# Patient Record
Sex: MALE | Race: WHITE | NOT HISPANIC OR LATINO | Employment: UNEMPLOYED | URBAN - METROPOLITAN AREA
[De-identification: names, ages, dates, MRNs, and addresses within clinical notes are randomized per-mention and may not be internally consistent; named-entity substitution may affect disease eponyms.]

---

## 2019-03-20 ENCOUNTER — HOSPITAL ENCOUNTER (EMERGENCY)
Facility: CLINIC | Age: 49
Discharge: HOME | End: 2019-03-21
Attending: EMERGENCY MEDICINE | Admitting: EMERGENCY MEDICINE
Payer: COMMERCIAL

## 2019-03-20 VITALS
HEART RATE: 75 BPM | DIASTOLIC BLOOD PRESSURE: 81 MMHG | BODY MASS INDEX: 31.83 KG/M2 | OXYGEN SATURATION: 95 % | SYSTOLIC BLOOD PRESSURE: 131 MMHG | RESPIRATION RATE: 12 BRPM | HEIGHT: 72 IN | TEMPERATURE: 97.5 F | WEIGHT: 235 LBS

## 2019-03-20 DIAGNOSIS — F10.920 ALCOHOLIC INTOXICATION WITHOUT COMPLICATION (H): ICD-10-CM

## 2019-03-20 PROCEDURE — 99282 EMERGENCY DEPT VISIT SF MDM: CPT

## 2019-03-20 SDOH — HEALTH STABILITY: MENTAL HEALTH: HOW OFTEN DO YOU HAVE A DRINK CONTAINING ALCOHOL?: NEVER

## 2019-03-20 ASSESSMENT — MIFFLIN-ST. JEOR: SCORE: 1963.95

## 2019-03-20 NOTE — ED AVS SNAPSHOT
Emergency Department  [address] [place] [address]  Phone:  [phone]  Fax:  [phone]                                    Merrill Corcoran   MRN: [id number]    Department:   Emergency Department   Date of Visit:  3/20/2019           After Visit Summary Signature Page    I have received my discharge instructions, and my questions have been answered. I have discussed any challenges I see with this plan with the nurse or doctor.    ..........................................................................................................................................  Patient/Patient Representative Signature      ..........................................................................................................................................  Patient Representative Print Name and Relationship to Patient    ..................................................               ................................................  Date                                   Time    ..........................................................................................................................................  Reviewed by Signature/Title    ...................................................              ..............................................  Date                                               Time          22EPIC Rev 08/18

## 2019-03-21 LAB — ALCOHOL BREATH TEST: 0.1 (ref 0–0.01)

## 2019-03-21 NOTE — ED NOTES
Pt was escorted to restroom via guard x1 and RN x1. Pt is noted to be very intoxicated but is OK on his feet. Pt was returned to bed from restroom.

## 2019-03-21 NOTE — ED PROVIDER NOTES
History     Chief Complaint:  Alcohol Intoxication     History limited due to alcohol intoxication. History obtained by EMS.     HPI   Merrill Corcoran is a 48 year old male who presents to the emergency department today via EMS for evaluation of alcohol intoxication. EMS report that [place]. He was helped off by transit police, and initially became aggressive. EMS arrived and restraints were put on, but he has been cooperative with them. Two pints of vodka were found on his person. No other drugs.     On recheck, patient reports that he lives in an apartment by himself on [place] [place] [address]. He denies any pain or other injuries. No complaints.     Allergies:  No Known Drug Allergies    Medications:    Medications reviewed. No current medications.     Past Medical History:    Medical history reviewed. No pertinent medical history.    Past Surgical History:    Surgical history reviewed. No pertinent surgical history.    Family History:    Family history reviewed. No pertinent family history.      Social History:  Smoking Status: Current every day smoker.  Alcohol Use: Positive.  Drug Use: Positive, marijuana      Review of Systems   Unable to perform ROS: Mental status change       Physical Exam     Patient Vitals for the past 24 hrs:   BP Temp Temp src Pulse Resp SpO2 Height Weight   03/20/19 1935 -- 97.5  F (36.4  C) Temporal 75 12 95 % 1.829 m (6') 106.6 kg (235 lb)   03/20/19 1930 131/81 -- -- -- -- -- -- --     Physical Exam  General: Resting on the gurney, appears uncomfortable  Head:  The scalp, face, and head appear normal  Mouth/Throat: Mucus membranes are moist  CV:  Regular rate    Normal S1 and S2  No pathological murmur   Resp:  Breath sounds clear and equal bilaterally    Non-labored, no retractions or accessory muscle use    No coarseness    No wheezing   GI:  Abdomen is soft, no rigidity    No tenderness to palpation  MS:  Normal motor assessment of  all extremities.    Good capillary refill noted.  Skin:   No rash or lesions noted.  Neuro:   Speech is somewhat slurred. Moves all extremities equally. No apparent deficit.  Psych:  Awake. Alert.  .       No thoughts of harming himself or others.     Emergency Department Course     Emergency Department Course:    1920 Nursing notes and vitals reviewed.    1921 I performed an exam of the patient as documented above.     2350 Rechecked.    0100 The care of this patient was signed out to my partner Dr. Morgan.     Impression & Plan      Medical Decision Making:  Merrill Corcoran is a 48 year old male who presents for evaluation of alcohol intoxication. Patient has no known history of Delirium tremens or alcohol withdrawal seizures. There are no signs of co-ingestion including acetaminophen, drugs, medications, volatile alcohols. There are no signs of trauma related to alcohol use and no further workup is needed including head CT.     Patient will be signed out to my partner Dr. Morgan with plan for discharge when buses are running    Diagnosis:      ICD-10-CM    1. Alcoholic intoxication without complication (H) F10.920      Disposition:   Discharge.     Scribe Disclosure:  Ivis PAYNE, am serving as a scribe at 8:18 PM on 3/20/2019 to document services personally performed by Mariza Galo MD based on my observations and the provider's statements to me.       EMERGENCY DEPARTMENT       Mariza Galo MD  03/21/19 0145

## 2019-03-21 NOTE — DISCHARGE INSTRUCTIONS
Discharge Instructions  Alcohol Intoxication    You have been seen today with alcohol intoxication. This means that you have enough alcohol in your system to impair your ability to mentally and physically function, perhaps to the extent that you were unable to care for yourself.    Generally, every Emergency Department visit should have a follow-up clinic visit with either a primary or a specialty clinic/provider. Please follow-up as instructed by your emergency provider today.    You may have come to the Emergency Department because of your intoxication, or for another reason, such as because of an injury. No matter what the case is, this visit is a ?red flag? regarding alcohol use, and you should consider whether your drinking pattern is a problem for you.     You may be at risk for alcohol-related problems if:    Men: you drink more than 14 drinks per week, or more than 4 drinks per occasion.    Women: you drink more than 7 drinks per week or more than 3 drinks per occasion.    You have black-outs.  You do things you regret while drinking.  You have legal problems because of drinking.  You have job problems because of drinking (you call in sick to work because of drinking).    CAGE Questions  Have you ever felt you should cut down on your drinking?  Have people annoyed you by criticizing your drinking?  Have you ever felt bad or guilty about your drinking?  Have you ever had a drink first thing in the morning to steady your nerves or get rid of a hangover (eye opener)?    If you answer yes to any of the CAGE questions, you may have a problem with alcohol.      Return to the Emergency Department if:  You become shaky or tremble when you try to stop drinking.   You have severe abdominal pain (belly pain).   You have a seizure or pass out.    You vomit (throw up) blood or have blood in your stool. This may be bright red or it may look like black coffee grounds.  You become lightheaded or faint.      For further  [facility]   Procedure Discharge Instructions    Today you saw:    Dr. Sandro Hoyt    You had an:  Lumbar transforaminal Epidural steroid injection       Medications used:  Lidocaine   Bupivacaine   Dexamethasone Depo-medrol  IsoVue            Be cautious when walking. Numbness and/or weakness in the lower extremities may occur for up to 6-8 hours after the procedure due to effect of the local anesthetic    Do not drive for 6 hours. The effect of the local anesthetic could slow your reflexes.     You may resume your regular activities after 24 hours    Avoid strenuous activity for the first 24 hours    You may shower, however avoid swimming, tub baths or hot tubs for 24 hours following your procedure    You may have a mild to moderate increase in pain for several days following the injection.    It may take up to 14 days for the steroid medication to start working although you may feel the effect as early as a few days after the procedure.       You may use ice packs for 10-15 minutes, 3 to 4 times a day at the injection site for comfort    Do not use heat to painful areas for 6 to 8 hours. This will give the local anesthetic time to wear off and prevent you from accidentally burning your skin.     You may use anti-inflammatory medications (such as Ibuprofen or Aleve or Advil) or Tylenol for pain control if necessary    Possible side effects of steroids that you may experience include flushing, elevated blood pressure, increased appetite, mild headaches and restlessness.  All of these symptoms will get better with time.    If you experience any of the following, call the Pain Clinic during work hours at [phone] or the Provider Line after hours at [phone]:  -Fever over 100 degree F  -Swelling, bleeding, redness, drainage, warmth at the injection site  -Progressive weakness or numbness in your legs or arms  -Loss of bowel or bladder function  -Unusual headache that is not relieved  by Tylenol or other pain reliever  -Unusual new onset of pain that is not improving       help, contact:   Your caregiver.    Alcoholics Anonymous (AA).    [facility] Intergroup: [phone]  [facility] Central Office: [phone]   A drug or alcohol rehabilitation program.    You can get information on alcohol resources and groups by calling the number 211 or [phone] on any phone.     Seek medical care if:  You have persistent vomiting.   You have persistent pain in any part of your body.    You do not feel better after a few days.    If you were given a prescription for medicine here today, be sure to read all of the information (including the package insert) that comes with your prescription.  This will include important information about the medicine, its side effects, and any warnings that you need to know about.  The pharmacist who fills the prescription can provide more information and answer questions you may have about the medicine.  If you have questions or concerns that the pharmacist cannot address, please call or return to the Emergency Department.   Remember that you can always come back to the Emergency Department if you are not able to see your regular doctor in the amount of time listed above, if you get any new symptoms, or if there is anything that worries you.

## 2019-03-21 NOTE — ED NOTES
"Patient presents to  staff window multiple times inquiring about his length of ED stay and if he is under any charges.  Patient appears shocked when he is oriented to date and time; he stated \"dude it's not February anymore\"  Patient given TV remote for entertainment purposes.  Patient is calm, cooperative and pleasant with staff interactions.  Verbally reviewed patient's plan to stay in ED for the night with possible discharge in AM; patient agrees with plan and inquires about bus schedule.   "

## 2019-03-21 NOTE — ED NOTES
Bed: [facility]  Expected date: 3/20/19  Expected time: 7:05 PM  Means of arrival:   Comments:  [age]M ETOH/agitation/cooperative now

## 2019-03-21 NOTE — ED NOTES
Pt walked from ED room to restroom. Pt was given ice water, toothbrush/toothpaste and a comb per pt's request.

## 2019-08-01 ENCOUNTER — HOSPITAL ENCOUNTER (EMERGENCY)
Facility: CLINIC | Age: 49
Discharge: HOME | End: 2019-08-02
Attending: FAMILY MEDICINE | Admitting: FAMILY MEDICINE
Payer: COMMERCIAL

## 2019-08-01 DIAGNOSIS — F10.920 ALCOHOLIC INTOXICATION WITHOUT COMPLICATION (H): ICD-10-CM

## 2019-08-01 LAB
ALBUMIN SERPL-MCNC: 4 G/DL (ref 3.4–5)
ALP SERPL-CCNC: 86 U/L (ref 40–150)
ALT SERPL W P-5'-P-CCNC: 44 U/L (ref 0–70)
ANION GAP SERPL CALCULATED.3IONS-SCNC: 9 MMOL/L (ref 3–14)
APAP SERPL-MCNC: <2 MG/L (ref 10–20)
AST SERPL W P-5'-P-CCNC: 32 U/L (ref 0–45)
BASOPHILS # BLD AUTO: 0.1 10E9/L (ref 0–0.2)
BASOPHILS NFR BLD AUTO: 0.8 %
BILIRUB SERPL-MCNC: 0.4 MG/DL (ref 0.2–1.3)
BUN SERPL-MCNC: 13 MG/DL (ref 7–30)
CALCIUM SERPL-MCNC: 9 MG/DL (ref 8.5–10.1)
CHLORIDE SERPL-SCNC: 111 MMOL/L (ref 94–109)
CO2 SERPL-SCNC: 24 MMOL/L (ref 20–32)
CREAT SERPL-MCNC: 1.07 MG/DL (ref 0.66–1.25)
DIFFERENTIAL METHOD BLD: ABNORMAL
EOSINOPHIL # BLD AUTO: 0.1 10E9/L (ref 0–0.7)
EOSINOPHIL NFR BLD AUTO: 1.7 %
ERYTHROCYTE [DISTWIDTH] IN BLOOD BY AUTOMATED COUNT: 13.8 % (ref 10–15)
ETHANOL SERPL-MCNC: 0.28 G/DL
GFR SERPL CREATININE-BSD FRML MDRD: 80 ML/MIN/{1.73_M2}
GLUCOSE SERPL-MCNC: 90 MG/DL (ref 70–99)
HCT VFR BLD AUTO: 46.3 % (ref 40–53)
HGB BLD-MCNC: 14.9 G/DL (ref 13.3–17.7)
IMM GRANULOCYTES # BLD: 0 10E9/L (ref 0–0.4)
IMM GRANULOCYTES NFR BLD: 0.3 %
LIPASE SERPL-CCNC: 96 U/L (ref 73–393)
LYMPHOCYTES # BLD AUTO: 2.5 10E9/L (ref 0.8–5.3)
LYMPHOCYTES NFR BLD AUTO: 34.3 %
MCH RBC QN AUTO: 33.1 PG (ref 26.5–33)
MCHC RBC AUTO-ENTMCNC: 32.2 G/DL (ref 31.5–36.5)
MCV RBC AUTO: 103 FL (ref 78–100)
MONOCYTES # BLD AUTO: 0.5 10E9/L (ref 0–1.3)
MONOCYTES NFR BLD AUTO: 7.4 %
NEUTROPHILS # BLD AUTO: 4 10E9/L (ref 1.6–8.3)
NEUTROPHILS NFR BLD AUTO: 55.5 %
NRBC # BLD AUTO: 0 10*3/UL
NRBC BLD AUTO-RTO: 0 /100
PLATELET # BLD AUTO: 252 10E9/L (ref 150–450)
POTASSIUM SERPL-SCNC: 3.7 MMOL/L (ref 3.4–5.3)
PROT SERPL-MCNC: 7.9 G/DL (ref 6.8–8.8)
RBC # BLD AUTO: 4.5 10E12/L (ref 4.4–5.9)
SALICYLATES SERPL-MCNC: <2 MG/DL
SODIUM SERPL-SCNC: 144 MMOL/L (ref 133–144)
TROPONIN I SERPL-MCNC: <0.015 UG/L (ref 0–0.04)
WBC # BLD AUTO: 7.2 10E9/L (ref 4–11)

## 2019-08-01 PROCEDURE — 80329 ANALGESICS NON-OPIOID 1 OR 2: CPT | Performed by: FAMILY MEDICINE

## 2019-08-01 PROCEDURE — 99285 EMERGENCY DEPT VISIT HI MDM: CPT | Mod: 25 | Performed by: FAMILY MEDICINE

## 2019-08-01 PROCEDURE — 25000128 H RX IP 250 OP 636

## 2019-08-01 PROCEDURE — 80053 COMPREHEN METABOLIC PANEL: CPT | Performed by: FAMILY MEDICINE

## 2019-08-01 PROCEDURE — 84484 ASSAY OF TROPONIN QUANT: CPT | Performed by: FAMILY MEDICINE

## 2019-08-01 PROCEDURE — 93005 ELECTROCARDIOGRAM TRACING: CPT | Performed by: FAMILY MEDICINE

## 2019-08-01 PROCEDURE — 99284 EMERGENCY DEPT VISIT MOD MDM: CPT | Mod: Z6 | Performed by: FAMILY MEDICINE

## 2019-08-01 PROCEDURE — 25000128 H RX IP 250 OP 636: Performed by: FAMILY MEDICINE

## 2019-08-01 PROCEDURE — 96375 TX/PRO/DX INJ NEW DRUG ADDON: CPT | Performed by: FAMILY MEDICINE

## 2019-08-01 PROCEDURE — 96361 HYDRATE IV INFUSION ADD-ON: CPT | Performed by: FAMILY MEDICINE

## 2019-08-01 PROCEDURE — 25800030 ZZH RX IP 258 OP 636: Performed by: FAMILY MEDICINE

## 2019-08-01 PROCEDURE — 83690 ASSAY OF LIPASE: CPT | Performed by: FAMILY MEDICINE

## 2019-08-01 PROCEDURE — 96374 THER/PROPH/DIAG INJ IV PUSH: CPT | Performed by: FAMILY MEDICINE

## 2019-08-01 PROCEDURE — 80178 ASSAY OF LITHIUM: CPT | Performed by: FAMILY MEDICINE

## 2019-08-01 PROCEDURE — 85025 COMPLETE CBC W/AUTO DIFF WBC: CPT | Performed by: FAMILY MEDICINE

## 2019-08-01 PROCEDURE — 80320 DRUG SCREEN QUANTALCOHOLS: CPT | Performed by: FAMILY MEDICINE

## 2019-08-01 PROCEDURE — 80307 DRUG TEST PRSMV CHEM ANLYZR: CPT | Performed by: FAMILY MEDICINE

## 2019-08-01 RX ORDER — OLANZAPINE 10 MG/2ML
10 INJECTION, POWDER, FOR SOLUTION INTRAMUSCULAR ONCE
Status: COMPLETED | OUTPATIENT
Start: 2019-08-01 | End: 2019-08-01

## 2019-08-01 RX ORDER — GABAPENTIN 300 MG/1
300 CAPSULE ORAL 3 TIMES DAILY
Status: ON HOLD | COMMUNITY
Start: 2019-07-17 | End: 2019-08-06

## 2019-08-01 RX ORDER — RISPERIDONE 4 MG/1
TABLET ORAL
COMMUNITY
Start: 2010-08-06 | End: 2019-08-05

## 2019-08-01 RX ORDER — HALOPERIDOL 5 MG/ML
5 INJECTION INTRAMUSCULAR ONCE
Status: COMPLETED | OUTPATIENT
Start: 2019-08-01 | End: 2019-08-01

## 2019-08-01 RX ORDER — LITHIUM CARBONATE 300 MG/1
TABLET, FILM COATED, EXTENDED RELEASE ORAL
COMMUNITY
Start: 2010-08-06 | End: 2019-08-05

## 2019-08-01 RX ORDER — OLANZAPINE 10 MG/2ML
INJECTION, POWDER, FOR SOLUTION INTRAMUSCULAR
Status: COMPLETED
Start: 2019-08-01 | End: 2019-08-01

## 2019-08-01 RX ORDER — KETAMINE HYDROCHLORIDE 100 MG/ML
420 INJECTION, SOLUTION INTRAMUSCULAR; INTRAVENOUS ONCE
Status: DISCONTINUED | OUTPATIENT
Start: 2019-08-01 | End: 2019-08-02 | Stop reason: HOSPADM

## 2019-08-01 RX ORDER — SODIUM CHLORIDE, SODIUM LACTATE, POTASSIUM CHLORIDE, CALCIUM CHLORIDE 600; 310; 30; 20 MG/100ML; MG/100ML; MG/100ML; MG/100ML
1000 INJECTION, SOLUTION INTRAVENOUS CONTINUOUS
Status: DISCONTINUED | OUTPATIENT
Start: 2019-08-01 | End: 2019-08-02 | Stop reason: HOSPADM

## 2019-08-01 RX ADMIN — OLANZAPINE 10 MG: 10 INJECTION, POWDER, FOR SOLUTION INTRAMUSCULAR at 21:49

## 2019-08-01 RX ADMIN — SODIUM CHLORIDE, POTASSIUM CHLORIDE, SODIUM LACTATE AND CALCIUM CHLORIDE 500 ML: 600; 310; 30; 20 INJECTION, SOLUTION INTRAVENOUS at 21:48

## 2019-08-01 RX ADMIN — HALOPERIDOL LACTATE 5 MG: 5 INJECTION, SOLUTION INTRAMUSCULAR at 22:05

## 2019-08-01 NOTE — ED AVS SNAPSHOT
[facility] Emergency Department  [address] [address]  Phone:  [phone]  Fax:  [phone]                                    Merrill Corcoran   MRN: [id number]    Department:  [facility] Emergency Department   Date of Visit:  8/1/2019           After Visit Summary Signature Page    I have received my discharge instructions, and my questions have been answered. I have discussed any challenges I see with this plan with the nurse or doctor.    ..........................................................................................................................................  Patient/Patient Representative Signature      ..........................................................................................................................................  Patient Representative Print Name and Relationship to Patient    ..................................................               ................................................  Date                                   Time    ..........................................................................................................................................  Reviewed by Signature/Title    ...................................................              ..............................................  Date                                               Time          22EPIC Rev 08/18

## 2019-08-02 VITALS
RESPIRATION RATE: 13 BRPM | SYSTOLIC BLOOD PRESSURE: 114 MMHG | TEMPERATURE: 98 F | OXYGEN SATURATION: 96 % | HEART RATE: 82 BPM | DIASTOLIC BLOOD PRESSURE: 70 MMHG

## 2019-08-02 LAB
AMPHETAMINES UR QL SCN: NEGATIVE
BARBITURATES UR QL: NEGATIVE
BENZODIAZ UR QL: POSITIVE
CANNABINOIDS UR QL SCN: NEGATIVE
COCAINE UR QL: NEGATIVE
LITHIUM SERPL-SCNC: <0.2 MMOL/L (ref 0.6–1.2)
OPIATES UR QL SCN: NEGATIVE
PCP UR QL SCN: NEGATIVE

## 2019-08-02 NOTE — ED NOTES
Restraints removed after pt agreed to remain calm and cooperative. Pt assisted to sitting on side of bed to use urinal. Pt unable to urinate, declined offer of water to drink.

## 2019-08-02 NOTE — ED NOTES
Pt arrived in restraints via EMS to ER, pt pulling on restraints. Order obtained from Dr Rascon for zyprexa that was given to pt before he was removed from EMS restraints to ER restraints. 4 locked wrist restraints and chest posey put on, less struggling by pt as zyprexa took effect. Pt has healing bruising on abdomen and scattered rash and scabs on lower legs.

## 2019-08-02 NOTE — DISCHARGE INSTRUCTIONS
1) you are discharged home on your own accord after period of observation and after you were allowed to metabolize your alcohol.    2) Follow-up care with your primary provider is suggested.    3) if you desire help or support for alcohol dependence

## 2019-08-02 NOTE — ED PROVIDER NOTES
HPI   The patient is a 48-year-old male presenting by EMS transport for alcohol intoxication and combative behavior.  The patient was just discharged from the [facility] this morning.  He was admitted for alcohol withdrawal symptoms.  There is a discharge summary note on his person.  He apparently takes gabapentin for a seizure disorder though is not had any recent seizures, per this report.  He drinks alcohol regularly and has a known history of alcoholism.  No reported history of drugs of abuse.  He denies any drugs of abuse.    The patient was found at a local hotel to be combative and obviously intoxicated.  Police were called and then EMS.  The patient has not been violent or hitting but is not cooperative feet and hand straps for transport.  He was given 2 mg of Versed in route.  Here, the patient reports some chest pain.  He cannot give any details.  He denies other symptoms when questioned, specifically.  He denies any recent falls or injuries.  When asked about the bruising on his abdomen he mumbled and I couldn't understand him.  Overall is a very poor historian.        Allergies:  No Known Allergies  Problem List:    Patient Active Problem List    Diagnosis Date Noted     Malignant neoplasm of urinary bladder (H) 03/09/2012     Priority: Medium     Lung nodule 01/31/2012     Priority: Medium     Psoriasis 02/06/2008     Priority: Medium     Drug dependence (H) 06/12/2007     Priority: Medium     Overview:   Epic        Alcohol dependence with withdrawal (H) 12/21/2004     Priority: Medium     Overview:   ALCOHOL DEPENDENCE UNSPECIFIED(aka ALCOHOLISM)       Antisocial personality disorder (H) 12/21/2004     Priority: Medium     Calculus of kidney 12/21/2004     Priority: Medium     Overview:   CALCULUS OF KIDNEY(aka NEPHROLITHIASIS)       Delay in development 12/21/2004     Priority: Medium     Essential hypertension, benign 12/21/2004     Priority: Medium     Overview:   BENIGN HYPERTENSION(aka  HYPERTENSION)       Hepatitis C carrier (H) 12/21/2004     Priority: Medium     Schizoaffective disorder, chronic condition (H) 10/25/2004     Priority: Medium      Past Medical History:    No past medical history on file.  Past Surgical History:    No past surgical history on file.  Family History:    No family history on file.  Social History:  Marital Status:  Single [1]  Social History     Tobacco Use     Smoking status: Current Every Day Smoker   Substance Use Topics     Alcohol use: Yes     Frequency: Never     Drug use: Yes     Types: Marijuana      Medications:      gabapentin (NEURONTIN) 300 MG capsule   lithium ER (LITHOBID) 300 MG CR tablet   risperiDONE (RISPERDAL) 4 MG tablet     Review of Systems   All other systems reviewed and are negative.      PE   BP: 100/48  Pulse: 86  Heart Rate: 83  Temp: 98  F (36.7  C)  Resp: (!) 0  SpO2: (!) 86 %  Physical Exam   Constitutional: He appears distressed.   Obviously intoxicated.  He will attempt to answer questions but does so understandably but only briefly and then he mumbles.  He smells of alcohol.   HENT:   Head: Atraumatic.   Right Ear: External ear normal.   Left Ear: External ear normal.   Nose: Nose normal.   Mouth/Throat: Oropharynx is clear and moist.   Eyes: Pupils are equal, round, and reactive to light. EOM are normal. Right eye exhibits no discharge. Left eye exhibits no discharge. No scleral icterus.   Neck: Normal range of motion.   Cardiovascular: Normal rate, normal heart sounds and intact distal pulses.   Pulmonary/Chest: Breath sounds normal. No respiratory distress.   Abdominal: Soft. Bowel sounds are normal.   The patient complains of tenderness throughout.  He does have some overlying bruising that appears to be old.   Genitourinary: Penis normal.   Musculoskeletal: Normal range of motion. He exhibits no edema, tenderness or deformity.   Neurological:   Arouses easily to voice.   Skin: Skin is warm. No rash noted. He is not diaphoretic.    Psychiatric:   Intoxicated.   Nursing note and vitals reviewed.      ED COURSE and MDM   2133.  The patient is obviously intoxicated.  He was combative when EMS picked him up, as above.  He was given Versed in route.  I provided Zyprexa 10 mg IM here.  The patient required security transport into his room bed.  Lab values ordered.  Fluid bolus.  EKG.    2232.  The patient continued to yell out intermittently and so Haldol 5 mg IM was ordered.    2337.  Signed out to Dr. Sams.    LABS  Labs Ordered and Resulted from Time of ED Arrival Up to the Time of Departure from the ED   CBC WITH PLATELETS DIFFERENTIAL - Abnormal; Notable for the following components:       Result Value     (*)     MCH 33.1 (*)     All other components within normal limits   COMPREHENSIVE METABOLIC PANEL - Abnormal; Notable for the following components:    Chloride 111 (*)     All other components within normal limits   ALCOHOL ETHYL - Abnormal; Notable for the following components:    Ethanol g/dL 0.28 (*)     All other components within normal limits   LIPASE   ACETAMINOPHEN LEVEL   SALICYLATE LEVEL   TROPONIN I   LITHIUM LEVEL   DRUG ABUSE SCREEN 77 URINE (FL, RH, SH)   LITHIUM LEVEL   MAY SALINE LOCK IV       IMAGING  Images reviewed by me.  Radiology report also reviewed.  No orders to display       Medications   ketamine (KETALAR) (HIGH CONC) inj 420 mg (has no administration in time range)   lactated ringers infusion (has no administration in time range)   OLANZapine (zyPREXA) injection 10 mg (10 mg Intramuscular Given 8/1/19 2149)   lactated ringers BOLUS 500 mL (500 mLs Intravenous New Bag 8/1/19 2148)   haloperidol lactate (HALDOL) injection 5 mg (5 mg IV/IM Given 8/1/19 2205)         IMPRESSION   ETOH intoxication         Medication List      There are no discharge medications for this visit.                       Carlitos Rascon MD  08/01/19 8931

## 2019-08-02 NOTE — ED PROVIDER NOTES
Emergency Department Patient Sign-out     Brief HPI:  This is a 48 year old male signed out to me by Dr. Bain .  Please see initial provider note for details of the presentation.      Significant Events prior to my assuming care: Patient was recently discharged from [place] for management of alcohol intoxication but he admits that he declined help with his alcohol use and elected to return home.  He was brought in tonight by EMS for report of alcohol intoxication and belligerent behavior, police had been involved previously and requested EMS transport.  For the safety of himself and others, he required chemical restraint.  Plan at time of transfer of care from initial provider was to monitor while patient metabolizes and reassess before arranging disposition.       Exam:   Patient Vitals for the past 24 hrs:   BP Temp Temp src Pulse Heart Rate Resp SpO2   08/02/19 0500 -- -- -- -- 61 -- 96 %   08/02/19 0430 -- -- -- -- 58 13 97 %   08/02/19 0400 -- -- -- -- 60 15 97 %   08/02/19 0330 -- -- -- -- 61 -- 95 %   08/02/19 0300 -- -- -- -- 64 14 98 %   08/02/19 0244 -- -- -- -- 61 15 99 %   08/02/19 0235 -- -- -- -- 61 14 99 %   08/02/19 0212 112/70 -- -- 63 60 16 98 %   08/02/19 0210 (!) 88/50 -- -- 64 67 13 98 %   08/02/19 0130 -- -- -- -- 63 12 96 %   08/02/19 0102 -- -- -- -- 69 -- 95 %   08/02/19 0045 -- -- -- -- 67 15 98 %   08/02/19 0036 109/66 -- -- 71 109 -- 95 %   08/02/19 0018 91/41 -- -- 65 64 16 94 %   08/02/19 0000 96/52 -- -- 69 64 -- 97 %   08/01/19 2345 90/51 -- -- 64 81 -- 95 %   08/01/19 2331 122/70 -- -- 81 84 -- 95 %   08/01/19 2330 122/70 -- -- 81 81 -- 95 %   08/01/19 2315 112/68 -- -- 83 78 15 94 %   08/01/19 2300 111/63 -- -- 80 -- -- 97 %   08/01/19 2245 116/66 -- -- 76 -- -- 95 %   08/01/19 2230 103/61 -- -- 80 -- -- 90 %   08/01/19 2215 124/69 -- -- 83 -- -- (!) 87 %   08/01/19 2200 100/69 -- -- 82 84 12 92 %   08/01/19 2155 -- 98  F (36.7  C) Oral -- -- -- --   08/01/19 2145 115/70 -- --  86 86 17 90 %   08/01/19 2130 104/66 -- -- 82 81 14 91 %   08/01/19 2123 100/48 -- -- 86 83 (!) 0 (!) 86 %         ED RESULTS:   Results for orders placed or performed during the hospital encounter of 08/01/19 (from the past 24 hour(s))   CBC with platelets differential     Status: Abnormal    Collection Time: 08/01/19  9:37 PM   Result Value Ref Range    WBC 7.2 4.0 - 11.0 10e9/L    RBC Count 4.50 4.4 - 5.9 10e12/L    Hemoglobin 14.9 13.3 - 17.7 g/dL    Hematocrit 46.3 40.0 - 53.0 %     (H) 78 - 100 fl    MCH 33.1 (H) 26.5 - 33.0 pg    MCHC 32.2 31.5 - 36.5 g/dL    RDW 13.8 10.0 - 15.0 %    Platelet Count 252 150 - 450 10e9/L    Diff Method Automated Method     % Neutrophils 55.5 %    % Lymphocytes 34.3 %    % Monocytes 7.4 %    % Eosinophils 1.7 %    % Basophils 0.8 %    % Immature Granulocytes 0.3 %    Nucleated RBCs 0 0 /100    Absolute Neutrophil 4.0 1.6 - 8.3 10e9/L    Absolute Lymphocytes 2.5 0.8 - 5.3 10e9/L    Absolute Monocytes 0.5 0.0 - 1.3 10e9/L    Absolute Eosinophils 0.1 0.0 - 0.7 10e9/L    Absolute Basophils 0.1 0.0 - 0.2 10e9/L    Abs Immature Granulocytes 0.0 0 - 0.4 10e9/L    Absolute Nucleated RBC 0.0    Comprehensive metabolic panel     Status: Abnormal    Collection Time: 08/01/19  9:37 PM   Result Value Ref Range    Sodium 144 133 - 144 mmol/L    Potassium 3.7 3.4 - 5.3 mmol/L    Chloride 111 (H) 94 - 109 mmol/L    Carbon Dioxide 24 20 - 32 mmol/L    Anion Gap 9 3 - 14 mmol/L    Glucose 90 70 - 99 mg/dL    Urea Nitrogen 13 7 - 30 mg/dL    Creatinine 1.07 0.66 - 1.25 mg/dL    GFR Estimate 80 >60 mL/min/[1.73_m2]    GFR Estimate If Black >90 >60 mL/min/[1.73_m2]    Calcium 9.0 8.5 - 10.1 mg/dL    Bilirubin Total 0.4 0.2 - 1.3 mg/dL    Albumin 4.0 3.4 - 5.0 g/dL    Protein Total 7.9 6.8 - 8.8 g/dL    Alkaline Phosphatase 86 40 - 150 U/L    ALT 44 0 - 70 U/L    AST 32 0 - 45 U/L   Lipase     Status: None    Collection Time: 08/01/19  9:37 PM   Result Value Ref Range    Lipase 96 73 - 393  U/L   Alcohol ethyl     Status: Abnormal    Collection Time: 08/01/19  9:37 PM   Result Value Ref Range    Ethanol g/dL 0.28 (H) <0.01 g/dL   Acetaminophen level     Status: None    Collection Time: 08/01/19  9:37 PM   Result Value Ref Range    Acetaminophen Level <2 mg/L   Salicylate level     Status: None    Collection Time: 08/01/19  9:37 PM   Result Value Ref Range    Salicylate Level <2 mg/dL   Troponin I     Status: None    Collection Time: 08/01/19  9:37 PM   Result Value Ref Range    Troponin I ES <0.015 0.000 - 0.045 ug/L   Lithium level     Status: Abnormal    Collection Time: 08/01/19  9:37 PM   Result Value Ref Range    Lithium Level <0.20 (L) 0.60 - 1.20 mmol/L         ED MEDICATIONS:   Medications   ketamine (KETALAR) (HIGH CONC) inj 420 mg (0 mg Intramuscular Hold 8/1/19 2330)   lactated ringers infusion (0 mLs Intravenous Hold 8/2/19 0043)   OLANZapine (zyPREXA) injection 10 mg (10 mg Intramuscular Given 8/1/19 2149)   lactated ringers BOLUS 500 mL (0 mLs Intravenous Stopped 8/1/19 2330)   haloperidol lactate (HALDOL) injection 5 mg (5 mg IV/IM Given 8/1/19 2205)         Plan:    Patient remained comfortable throughout the night, monitored closely without event.  Signed out to oncoming morning physician Dr. Hunter for reevaluation and safe disposition plan.        Impression:    ICD-10-CM    1. Alcoholic intoxication without complication (H) F10.920            Merirll Navas,   08/02/19 0612

## 2019-08-02 NOTE — ED NOTES
Pt continued to pull on restraints, pt repositioned in bed, chest posey loosened and repositioned as it was pulling into pt armpits and he was reporting discomfort. This RN continues to have difficulty understanding pt due to mumbled speech. Pt continues to be on 1:1 monitoring.

## 2019-08-05 ENCOUNTER — RECORDS - HEALTHEAST (OUTPATIENT)
Dept: ADMINISTRATIVE | Facility: OTHER | Age: 49
End: 2019-08-05

## 2019-08-05 ENCOUNTER — HOSPITAL ENCOUNTER (INPATIENT)
Facility: CLINIC | Age: 49
LOS: 21 days | Discharge: PSYCHIATRIC OR SUBSTANCE USE FACILITY | DRG: 885 | End: 2019-08-27
Attending: EMERGENCY MEDICINE | Admitting: PSYCHIATRY & NEUROLOGY
Payer: COMMERCIAL

## 2019-08-05 DIAGNOSIS — Z71.6 ENCOUNTER FOR SMOKING CESSATION COUNSELING: ICD-10-CM

## 2019-08-05 DIAGNOSIS — R45.851 DEPRESSION WITH SUICIDAL IDEATION: ICD-10-CM

## 2019-08-05 DIAGNOSIS — F41.1 GAD (GENERALIZED ANXIETY DISORDER): ICD-10-CM

## 2019-08-05 DIAGNOSIS — F10.239 ALCOHOL DEPENDENCE WITH WITHDRAWAL WITH COMPLICATION (H): ICD-10-CM

## 2019-08-05 DIAGNOSIS — R06.2 WHEEZING: ICD-10-CM

## 2019-08-05 DIAGNOSIS — F32.A DEPRESSION WITH SUICIDAL IDEATION: ICD-10-CM

## 2019-08-05 DIAGNOSIS — G47.00 INSOMNIA, UNSPECIFIED TYPE: ICD-10-CM

## 2019-08-05 DIAGNOSIS — C84.09 MYCOSIS FUNGOIDES, EXTRANODAL AND SOLID ORGAN SITES (H): ICD-10-CM

## 2019-08-05 DIAGNOSIS — I10 ESSENTIAL HYPERTENSION, BENIGN: ICD-10-CM

## 2019-08-05 DIAGNOSIS — B18.2 HEPATITIS C CARRIER (H): Primary | ICD-10-CM

## 2019-08-05 DIAGNOSIS — F25.9 SCHIZOAFFECTIVE DISORDER, UNSPECIFIED TYPE (H): ICD-10-CM

## 2019-08-05 DIAGNOSIS — F10.220 ALCOHOL DEPENDENCE WITH UNCOMPLICATED INTOXICATION (H): ICD-10-CM

## 2019-08-05 DIAGNOSIS — R45.851 SUICIDE IDEATION: ICD-10-CM

## 2019-08-05 DIAGNOSIS — F17.200 TOBACCO USE DISORDER: ICD-10-CM

## 2019-08-05 DIAGNOSIS — E56.9 VITAMIN DEFICIENCY: ICD-10-CM

## 2019-08-05 LAB
ALBUMIN SERPL-MCNC: 3.6 G/DL (ref 3.4–5)
ALCOHOL BREATH TEST: 0.22 (ref 0–0.01)
ALP SERPL-CCNC: 93 U/L (ref 40–150)
ALT SERPL W P-5'-P-CCNC: 47 U/L (ref 0–70)
AMPHETAMINES UR QL SCN: NEGATIVE
ANION GAP SERPL CALCULATED.3IONS-SCNC: 8 MMOL/L (ref 3–14)
AST SERPL W P-5'-P-CCNC: 41 U/L (ref 0–45)
BARBITURATES UR QL: NEGATIVE
BASOPHILS # BLD AUTO: 0.1 10E9/L (ref 0–0.2)
BASOPHILS NFR BLD AUTO: 1.1 %
BENZODIAZ UR QL: POSITIVE
BILIRUB SERPL-MCNC: 0.4 MG/DL (ref 0.2–1.3)
BUN SERPL-MCNC: 9 MG/DL (ref 7–30)
CALCIUM SERPL-MCNC: 8.5 MG/DL (ref 8.5–10.1)
CANNABINOIDS UR QL SCN: NEGATIVE
CHLORIDE SERPL-SCNC: 107 MMOL/L (ref 94–109)
CO2 SERPL-SCNC: 29 MMOL/L (ref 20–32)
COCAINE UR QL: NEGATIVE
CREAT SERPL-MCNC: 0.76 MG/DL (ref 0.66–1.25)
DIFFERENTIAL METHOD BLD: ABNORMAL
EOSINOPHIL # BLD AUTO: 0.3 10E9/L (ref 0–0.7)
EOSINOPHIL NFR BLD AUTO: 4.5 %
ERYTHROCYTE [DISTWIDTH] IN BLOOD BY AUTOMATED COUNT: 14.3 % (ref 10–15)
ETHANOL UR QL SCN: POSITIVE
GFR SERPL CREATININE-BSD FRML MDRD: >90 ML/MIN/{1.73_M2}
GLUCOSE SERPL-MCNC: 87 MG/DL (ref 70–99)
HCT VFR BLD AUTO: 42.5 % (ref 40–53)
HGB BLD-MCNC: 14.3 G/DL (ref 13.3–17.7)
IMM GRANULOCYTES # BLD: 0 10E9/L (ref 0–0.4)
IMM GRANULOCYTES NFR BLD: 0.3 %
LYMPHOCYTES # BLD AUTO: 3.2 10E9/L (ref 0.8–5.3)
LYMPHOCYTES NFR BLD AUTO: 50.5 %
MAGNESIUM SERPL-MCNC: 2.1 MG/DL (ref 1.6–2.3)
MCH RBC QN AUTO: 33.7 PG (ref 26.5–33)
MCHC RBC AUTO-ENTMCNC: 33.6 G/DL (ref 31.5–36.5)
MCV RBC AUTO: 100 FL (ref 78–100)
MONOCYTES # BLD AUTO: 0.6 10E9/L (ref 0–1.3)
MONOCYTES NFR BLD AUTO: 9.5 %
NEUTROPHILS # BLD AUTO: 2.2 10E9/L (ref 1.6–8.3)
NEUTROPHILS NFR BLD AUTO: 34.1 %
NRBC # BLD AUTO: 0 10*3/UL
NRBC BLD AUTO-RTO: 0 /100
OPIATES UR QL SCN: NEGATIVE
PLATELET # BLD AUTO: 280 10E9/L (ref 150–450)
POTASSIUM SERPL-SCNC: 3.8 MMOL/L (ref 3.4–5.3)
PROT SERPL-MCNC: 7.6 G/DL (ref 6.8–8.8)
RBC # BLD AUTO: 4.24 10E12/L (ref 4.4–5.9)
SODIUM SERPL-SCNC: 144 MMOL/L (ref 133–144)
WBC # BLD AUTO: 6.4 10E9/L (ref 4–11)

## 2019-08-05 PROCEDURE — 96360 HYDRATION IV INFUSION INIT: CPT | Performed by: EMERGENCY MEDICINE

## 2019-08-05 PROCEDURE — 80320 DRUG SCREEN QUANTALCOHOLS: CPT | Performed by: FAMILY MEDICINE

## 2019-08-05 PROCEDURE — 25000128 H RX IP 250 OP 636: Performed by: EMERGENCY MEDICINE

## 2019-08-05 PROCEDURE — 25000125 ZZHC RX 250: Performed by: EMERGENCY MEDICINE

## 2019-08-05 PROCEDURE — 82075 ASSAY OF BREATH ETHANOL: CPT | Performed by: EMERGENCY MEDICINE

## 2019-08-05 PROCEDURE — 99285 EMERGENCY DEPT VISIT HI MDM: CPT | Mod: 25 | Performed by: EMERGENCY MEDICINE

## 2019-08-05 PROCEDURE — 83735 ASSAY OF MAGNESIUM: CPT | Performed by: EMERGENCY MEDICINE

## 2019-08-05 PROCEDURE — 85025 COMPLETE CBC W/AUTO DIFF WBC: CPT | Performed by: EMERGENCY MEDICINE

## 2019-08-05 PROCEDURE — 80307 DRUG TEST PRSMV CHEM ANLYZR: CPT | Performed by: FAMILY MEDICINE

## 2019-08-05 PROCEDURE — 80053 COMPREHEN METABOLIC PANEL: CPT | Performed by: EMERGENCY MEDICINE

## 2019-08-05 PROCEDURE — 99285 EMERGENCY DEPT VISIT HI MDM: CPT | Mod: Z6 | Performed by: EMERGENCY MEDICINE

## 2019-08-05 PROCEDURE — 96361 HYDRATE IV INFUSION ADD-ON: CPT | Performed by: EMERGENCY MEDICINE

## 2019-08-05 PROCEDURE — 94640 AIRWAY INHALATION TREATMENT: CPT | Performed by: EMERGENCY MEDICINE

## 2019-08-05 RX ORDER — MULTIPLE VITAMINS W/ MINERALS TAB 9MG-400MCG
1 TAB ORAL DAILY
Status: DISCONTINUED | OUTPATIENT
Start: 2019-08-06 | End: 2019-08-27 | Stop reason: HOSPADM

## 2019-08-05 RX ORDER — IPRATROPIUM BROMIDE AND ALBUTEROL SULFATE 2.5; .5 MG/3ML; MG/3ML
3 SOLUTION RESPIRATORY (INHALATION) ONCE
Status: COMPLETED | OUTPATIENT
Start: 2019-08-05 | End: 2019-08-05

## 2019-08-05 RX ORDER — DIAZEPAM 5 MG
5-20 TABLET ORAL EVERY 30 MIN PRN
Status: DISCONTINUED | OUTPATIENT
Start: 2019-08-05 | End: 2019-08-06

## 2019-08-05 RX ORDER — LANOLIN ALCOHOL/MO/W.PET/CERES
100 CREAM (GRAM) TOPICAL DAILY
Status: DISCONTINUED | OUTPATIENT
Start: 2019-08-06 | End: 2019-08-27 | Stop reason: HOSPADM

## 2019-08-05 RX ORDER — FOLIC ACID 1 MG/1
1 TABLET ORAL DAILY
Status: DISCONTINUED | OUTPATIENT
Start: 2019-08-06 | End: 2019-08-27 | Stop reason: HOSPADM

## 2019-08-05 RX ADMIN — SODIUM CHLORIDE 1000 ML: 9 INJECTION, SOLUTION INTRAVENOUS at 21:42

## 2019-08-05 RX ADMIN — IPRATROPIUM BROMIDE AND ALBUTEROL SULFATE 3 ML: .5; 3 SOLUTION RESPIRATORY (INHALATION) at 21:38

## 2019-08-05 ASSESSMENT — ENCOUNTER SYMPTOMS
VOMITING: 0
DYSPHORIC MOOD: 1
HEADACHES: 0
FEVER: 0
COLOR CHANGE: 0
SHORTNESS OF BREATH: 0
NECK STIFFNESS: 0
ABDOMINAL PAIN: 0
CONFUSION: 0
ARTHRALGIAS: 0
DIFFICULTY URINATING: 0
EYE REDNESS: 0
NAUSEA: 0

## 2019-08-06 PROBLEM — F32.A DEPRESSION WITH SUICIDAL IDEATION: Status: ACTIVE | Noted: 2019-08-06

## 2019-08-06 PROBLEM — R45.851 DEPRESSION WITH SUICIDAL IDEATION: Status: ACTIVE | Noted: 2019-08-06

## 2019-08-06 PROCEDURE — 12400001 ZZH R&B MH UMMC

## 2019-08-06 PROCEDURE — 25000132 ZZH RX MED GY IP 250 OP 250 PS 637: Performed by: PSYCHIATRY & NEUROLOGY

## 2019-08-06 PROCEDURE — 25000132 ZZH RX MED GY IP 250 OP 250 PS 637: Performed by: EMERGENCY MEDICINE

## 2019-08-06 PROCEDURE — 99223 1ST HOSP IP/OBS HIGH 75: CPT | Mod: AI | Performed by: PSYCHIATRY & NEUROLOGY

## 2019-08-06 RX ORDER — OLANZAPINE 10 MG/2ML
10 INJECTION, POWDER, FOR SOLUTION INTRAMUSCULAR
Status: DISCONTINUED | OUTPATIENT
Start: 2019-08-06 | End: 2019-08-27 | Stop reason: HOSPADM

## 2019-08-06 RX ORDER — BISACODYL 10 MG
10 SUPPOSITORY, RECTAL RECTAL DAILY PRN
Status: DISCONTINUED | OUTPATIENT
Start: 2019-08-06 | End: 2019-08-27 | Stop reason: HOSPADM

## 2019-08-06 RX ORDER — TRAZODONE HYDROCHLORIDE 50 MG/1
50 TABLET, FILM COATED ORAL
Status: DISCONTINUED | OUTPATIENT
Start: 2019-08-06 | End: 2019-08-27 | Stop reason: HOSPADM

## 2019-08-06 RX ORDER — ALUMINA, MAGNESIA, AND SIMETHICONE 2400; 2400; 240 MG/30ML; MG/30ML; MG/30ML
30 SUSPENSION ORAL EVERY 4 HOURS PRN
Status: DISCONTINUED | OUTPATIENT
Start: 2019-08-06 | End: 2019-08-27 | Stop reason: HOSPADM

## 2019-08-06 RX ORDER — GABAPENTIN 300 MG/1
300 CAPSULE ORAL 3 TIMES DAILY
Status: DISCONTINUED | OUTPATIENT
Start: 2019-08-06 | End: 2019-08-27 | Stop reason: HOSPADM

## 2019-08-06 RX ORDER — DIAZEPAM 5 MG
5-20 TABLET ORAL EVERY 30 MIN PRN
Status: DISCONTINUED | OUTPATIENT
Start: 2019-08-06 | End: 2019-08-12

## 2019-08-06 RX ORDER — OLANZAPINE 10 MG/1
10 TABLET ORAL
Status: DISCONTINUED | OUTPATIENT
Start: 2019-08-06 | End: 2019-08-27 | Stop reason: HOSPADM

## 2019-08-06 RX ORDER — CLONIDINE HYDROCHLORIDE 0.1 MG/1
0.1 TABLET ORAL EVERY 4 HOURS PRN
Status: DISCONTINUED | OUTPATIENT
Start: 2019-08-06 | End: 2019-08-27 | Stop reason: HOSPADM

## 2019-08-06 RX ORDER — ACETAMINOPHEN 325 MG/1
650 TABLET ORAL EVERY 4 HOURS PRN
Status: DISCONTINUED | OUTPATIENT
Start: 2019-08-06 | End: 2019-08-27 | Stop reason: HOSPADM

## 2019-08-06 RX ORDER — HYDROXYZINE HYDROCHLORIDE 25 MG/1
25 TABLET, FILM COATED ORAL EVERY 4 HOURS PRN
Status: DISCONTINUED | OUTPATIENT
Start: 2019-08-06 | End: 2019-08-16

## 2019-08-06 RX ORDER — DIAZEPAM 5 MG
5-20 TABLET ORAL EVERY 30 MIN PRN
Status: DISCONTINUED | OUTPATIENT
Start: 2019-08-06 | End: 2019-08-06

## 2019-08-06 RX ADMIN — DIAZEPAM 5 MG: 5 TABLET ORAL at 18:42

## 2019-08-06 RX ADMIN — DIAZEPAM 5 MG: 5 TABLET ORAL at 06:48

## 2019-08-06 RX ADMIN — THIAMINE HCL TAB 100 MG 100 MG: 100 TAB at 08:27

## 2019-08-06 RX ADMIN — GABAPENTIN 300 MG: 300 CAPSULE ORAL at 14:44

## 2019-08-06 RX ADMIN — GABAPENTIN 300 MG: 300 CAPSULE ORAL at 20:20

## 2019-08-06 RX ADMIN — DIAZEPAM 10 MG: 5 TABLET ORAL at 09:08

## 2019-08-06 RX ADMIN — DIAZEPAM 10 MG: 5 TABLET ORAL at 04:43

## 2019-08-06 RX ADMIN — GABAPENTIN 300 MG: 300 CAPSULE ORAL at 08:27

## 2019-08-06 RX ADMIN — DIAZEPAM 10 MG: 5 TABLET ORAL at 08:14

## 2019-08-06 RX ADMIN — FOLIC ACID 1 MG: 1 TABLET ORAL at 08:27

## 2019-08-06 RX ADMIN — CLONIDINE HYDROCHLORIDE 0.1 MG: 0.1 TABLET ORAL at 18:42

## 2019-08-06 RX ADMIN — MULTIPLE VITAMINS W/ MINERALS TAB 1 TABLET: TAB at 08:27

## 2019-08-06 RX ADMIN — CLONIDINE HYDROCHLORIDE 0.1 MG: 0.1 TABLET ORAL at 09:57

## 2019-08-06 ASSESSMENT — ACTIVITIES OF DAILY LIVING (ADL)
HYGIENE/GROOMING: INDEPENDENT
TOILETING: 0-->INDEPENDENT
COGNITION: 0 - NO COGNITION ISSUES REPORTED
DRESS: 0-->INDEPENDENT
AMBULATION: 0-->INDEPENDENT
TRANSFERRING: 0-->INDEPENDENT
RETIRED_EATING: 0-->INDEPENDENT
SWALLOWING: 0-->SWALLOWS FOODS/LIQUIDS WITHOUT DIFFICULTY
ORAL_HYGIENE: INDEPENDENT
DRESS: SCRUBS (BEHAVIORAL HEALTH)
LAUNDRY: WITH SUPERVISION
BATHING: 0-->INDEPENDENT
FALL_HISTORY_WITHIN_LAST_SIX_MONTHS: NO
RETIRED_COMMUNICATION: 0-->UNDERSTANDS/COMMUNICATES WITHOUT DIFFICULTY

## 2019-08-06 ASSESSMENT — MIFFLIN-ST. JEOR: SCORE: 2014.3

## 2019-08-06 NOTE — PROGRESS NOTES
Pt spent most of his time in bed sleeping and he reported to the writer that he has nausea. He has been cooperative and pleasant on approach. He did not eat due to was hard to keep anything down. He has been isolative and withdrawn. He denies suicidal ideations or hallucinations.      08/06/19 1327   Behavioral Health   Hallucinations denies / not responding to hallucinations   Thinking distractable   Orientation time: oriented;date: oriented;place: oriented;person: oriented   Memory baseline memory   Insight poor   Judgement impaired   Eye Contact at examiner   Affect tense   Mood depressed;anxious   Physical Appearance/Attire appears stated age   Hygiene body odor   Suicidality other (see comments)  (Denies)   1. Wish to be Dead No   2. Non-Specific Active Suicidal Thoughts  No   Self Injury other (see comment)  (Denies)   Activity isolative;withdrawn   Speech clear;coherent   Medication Sensitivity no stated side effects   Psychomotor / Gait balanced;steady   Psycho Education   Type of Intervention 1:1 intervention   Response participates, initiates socially appropriate   Hours 0.5   Activities of Daily Living   Hygiene/Grooming independent   Oral Hygiene independent   Dress scrubs (behavioral health)   Laundry with supervision   Room Organization independent   Activity   Activity Assistance Provided independent

## 2019-08-06 NOTE — PROVIDER NOTIFICATION
A               Admission:  I am responsible for any personal items that are not sent to the safe or pharmacy.  [facility] is not responsible for loss, theft or damage of any property in my possession.    Signature:  _________________________________ Date: _______  Time: _____                                              Staff Signature:  ____________________________ Date: ________  Time: _____      2nd Staff person, if patient is unable/unwilling to sign:    Signature: ________________________________ Date: ________  Time: _____     Discharge:  [facility] has returned all of my personal belongings:    Signature: _________________________________ Date: ________  Time: _____                                          Staff Signature:  ____________________________ Date: ________  Time: _____         TO THE LOCKER    1 Brown wallet  1 black motorola cell phone  1 green cigarette lighter  3 sticks of marlboro cigarettes  1 grey T-shirt  1 blue jeans  1 pair of white Nike shoes  1 blue socks (Note: one missing)  1 small black comb      TO SECURITY    [place] Driving licence  Mastercard  # [id number]  [place] EBT card # [id number]  [facility] Card # [id number]  Cash dollars -  $6

## 2019-08-06 NOTE — ED NOTES
ED to Behavioral Floor Handoff    SITUATION  Merrill Corcoran is a 48 year old male who speaks English and lives in a home alone The patient arrived in the ED by public transportation from home with a complaint of Addiction Problem (drinks one ffth of abimbola per day. Requesting detox, has hx of seizures. ) and Suicidal (Also reports SI thoughts, stated he attempted to hang himself with  gas hose a few days ago.)  .The patient's current symptoms started/worsened 1 month(s) ago and during this time the symptoms have increased.   In the ED, pt was diagnosed with   Final diagnoses:   Suicide ideation   Alcohol dependence with uncomplicated intoxication (H)        Initial vitals were: BP: (!) 188/97  Pulse: 81  Heart Rate: 98  Temp: 98.6  F (37  C)  Resp: 16  Weight: 102.1 kg (225 lb)  SpO2: 100 %   --------  Is the patient diabetic? No   If yes, last blood glucose? --     If yes, was this treated in the ED? --  --------  Is the patient inebriated (ETOH) No or Impaired on other substances? No  MSSA done? Yes  Last MSSA score: 7   Were withdrawal symptoms treated? N/A  Does the patient have a seizure history? Yes. If yes, date of most recent seizure--per pt- 1 week ago  --------  Is the patient patient experiencing suicidal ideation? Reports SI with a plan.    Homicidal ideation? denies current or recent homicidal ideation or behaviors.    Self-injurious behavior/urges? denies current or recent self injurious behavior or ideation.  ------  Was pt aggressive in the ED No  Was a code called No  Is the pt now cooperative? Yes  -------  Meds given in ED:   Medications   diazepam (VALIUM) tablet 5-20 mg (has no administration in time range)   vitamin B1 (THIAMINE) tablet 100 mg (has no administration in time range)   folic acid (FOLVITE) tablet 1 mg (has no administration in time range)   multivitamin w/minerals (THERA-VIT-M) tablet 1 tablet (has no administration in time range)   ipratropium - albuterol 0.5 mg/2.5  mg/3 mL (DUONEB) neb solution 3 mL (3 mLs Nebulization Given 8/5/19 4834)   0.9% sodium chloride BOLUS (0 mLs Intravenous Stopped 8/5/19 1947)      Family present during ED course? No  Family currently present? No    BACKGROUND  Does the patient have a cognitive impairment or developmental disability? No  Allergies: No Known Allergies.   Social demographics are   Social History     Socioeconomic History     Marital status: Single     Spouse name: None     Number of children: None     Years of education: None     Highest education level: None   Occupational History     None   Social Needs     Financial resource strain: None     Food insecurity:     Worry: None     Inability: None     Transportation needs:     Medical: None     Non-medical: None   Tobacco Use     Smoking status: Current Every Day Smoker     Packs/day: 1.00     Smokeless tobacco: Never Used   Substance and Sexual Activity     Alcohol use: Yes     Frequency: Never     Comment: 1/5 abimbola/day     Drug use: Not Currently     Sexual activity: None   Lifestyle     Physical activity:     Days per week: None     Minutes per session: None     Stress: None   Relationships     Social connections:     Talks on phone: None     Gets together: None     Attends Anabaptist service: None     Active member of club or organization: None     Attends meetings of clubs or organizations: None     Relationship status: None     Intimate partner violence:     Fear of current or ex partner: None     Emotionally abused: None     Physically abused: None     Forced sexual activity: None   Other Topics Concern     None   Social History Narrative     None        ASSESSMENT  Labs results   Labs Ordered and Resulted from Time of ED Arrival Up to the Time of Departure from the ED   DRUG ABUSE SCREEN 6 CHEM DEP URINE ([facility]) - Abnormal; Notable for the following components:       Result Value    Benzodiazepine Qual Urine Positive (*)     Ethanol Qual Urine Positive (*)     All other  components within normal limits   CBC WITH PLATELETS DIFFERENTIAL - Abnormal; Notable for the following components:    RBC Count 4.24 (*)     MCH 33.7 (*)     All other components within normal limits   ALCOHOL BREATH TEST POCT - Abnormal; Notable for the following components:    Alcohol Breath Test 0.218 (*)     All other components within normal limits   COMPREHENSIVE METABOLIC PANEL   MAGNESIUM   PERIPHERAL IV CATHETER   MSSA SCORE AND VS   NOTIFY      Imaging Studies: No results found for this or any previous visit (from the past 24 hour(s)).   Most recent vital signs /76   Pulse 68   Temp 97.7  F (36.5  C) (Oral)   Resp 16   Wt 102.1 kg (225 lb)   SpO2 96%   BMI 30.52 kg/m     Abnormal labs/tests/findings requiring intervention:---   Pain control: pt had none  Nausea control: pt had none    RECOMMENDATION  Are any infection precautions needed (MRSA, VRE, etc.)? No If yes, what infection? --  ---  Does the patient have mobility issues? independently. If yes, what device does the pt use? ---  ---  Is patient on 72 hour hold or commitment? No If on 72 hour hold, have hold and rights been given to patient? No  Are admitting orders written if after 10 p.m. ?N/A  Tasks needing to be completed:---     JESSY PAYNE    [phone] [facility]   [phone] [facility]

## 2019-08-06 NOTE — PROGRESS NOTES
Pt is a 48 year old male admitted to station 20 with Suicidal ideation as well as alcohol dependence. Pt has history of Schizophrenia disorder, ECT txt.  Per report , pt has stopped taking his medications and started drinking 1/5 of vodka per day. Pt states that he has had alcohol withdrawal seizures in the past . Pt reports that he attempted to wrap a hose around his neck and tried to hang himself over the top of the door unsuccessfully.   Pt was calm and cooperative during the interview with this writer. He denied SI/SIB/AH/VH at this time; endorsed anxiety at 5/10. Mild tremor noted during the interview; MSSA score = 5 at 0100. Pt briefly oriented to the unit and directed to his room. At 0440 pt woke up, was shaky and sweaty; scored MSSA = 10.  Valium 10mg  Was given according to pt's MSSA score. Pts MSSA score at 0643 = 9; pt stated that he felt nauseated but not vomiting. Moderate tremors noted even with arms not extended. Valium 5 mg administered as ordered.  Pt is on seizure and withdrawal precaution . Will continue to monitor and offer support.

## 2019-08-06 NOTE — PHARMACY-ADMISSION MEDICATION HISTORY
Admission medication history interview status for the 8/5/2019 admission is complete. See Epic admission navigator for allergy information, pharmacy, prior to admission medications and immunization status.     Medication history interview sources:  patient, Care Everywhere    Changes made to PTA medication list (reason)  Added: aspirin 325mg tablets as needed for headaches   Deleted: gabapentin - Patient last picked up a  3 day supply in the beginning of June but mentioned they were still taking the medication.  Changed: n/a  Additional medication history information (including reliability of information, actions taken by pharmacist):    -Patient was very sleepy when asking about medications  -Patient reports getting gabapentin from [facility] and does not have a regular pharmacy.   -Patient reported being on no other medications other than aspirin for an occasional headache.      Prior to Admission medications    Medication Sig Last Dose Taking? Auth Provider   aspirin (ASA) 325 MG EC tablet Take 325 mg by mouth every 6 hours as needed for other (headache) Past Month at Unknown time Yes Unknown, Entered By History             Medication history completed by: Marko Hurley on 8/6/2019 at 3:40 PM

## 2019-08-06 NOTE — PROGRESS NOTES
"Initial Psychosocial Assessment  I have reviewed the chart, met with the patient, and developed Care Plan. Information for assessment was obtained from the patient, the patient's medical chart, and the patient's DEC assessment.      Presenting Problem: \"Merrill Corcoran is a 48 year old male who presents to the emergency department with concern for suicidal patient as well as alcohol dependence.  Patient reports a history of alcohol abuse and dependence for the past 13 months.  He states that he has been drinking 1/5 of vodka per day.  He states that he gets tremulous when he does not drink.  He also states that he has had alcohol withdrawal seizures in the past and may have had one as recently as a ago.  The patient states that he wishes to no longer drinking would like detox.  Additionally, the patient reports a history of schizoaffective disorder.  He has not been taking any medications for at least a year.  The patient reports that he attempted to kill himself by hanging within the past week.  The patient states that he wrapped a hose around his neck and tried to hang himself over the top of the door but that attempt was unsuccessful.  Patient denies any injury as a result.  Denies any sore throat, neck swelling, neck pain, or difficulty swallowing.  Patient denies any recent fall or injury.  He reports occasional nosebleed.  He also reports that he did have some blood in the toilet tissue intermittently over the past several months.  He denies any abdominal pain.  No nausea or vomiting.  No melena.  The patient does report cigarette smoking.  He denies any chest pain or dyspnea.\" - ED Provider Note (Destin Murphy MD, 08/05/19)    When asked about what led to this hospitalization, the patient stated \"I need to quit drinking.\"      History of Mental Health and Chemical Dependency: The patient has a history of inpatient mental health hospitalizations and chemical dependency treatment. The patient reported that his " last inpatient hospitalization was in 2006. He stated that he went to a treatment program in [place] but cannot recall the name of the facility. The patient stated that he has had a period of sobriety lasting 7 years but relapsed about 3 years ago. The patient reports that he is currently only using alcohol but has used meth. The patient reported that his last mental health hospitalization was 18 years ago at [facility]. The patient has a previous diagnosis of Schizoaffective Disorder. He also has had ECT in the past.     Significant Life Events  (Illness, Abuse, Trauma, Death): The patient has a history of homelessness. The patient denied any other significant life events.      Family/Living Situation: The patient is currently living independently in [place]. The patient reports no children or significant other. The patient stated that he was adopted and has no information related to his biological family so unable to assess for familial mental health hisotry. The patient stated that he does not currently have any contact with his adoptive parents. The patient reports that he does not have a support system. He stated that he does not currently have a sponsor or attend AA meetings.       Educational Background: The patient reported that he has a GED.      Financial Status: The patient is currently unemployed but stated that he receives disability.      Legal Issues: The patient is currently hospitalized voluntarily. The patient denies any other legal issues.      Ethnic/Cultural Issues: None.      Spiritual Orientation: None.       Service History: None.      Social Functioning (organization, interests): Unable to assess.      Current Treatment Providers are:  Primary Care: None.   Psychiatry: None.   Therapist: None.   : None.   Other Providers:     Social Service Assessment/Plan: CTC will explore options for follow up care and  provide a psychological assessment.Patient will be provided  with a safe environment, medication management, as well as offered groups for coping skills.

## 2019-08-06 NOTE — PROGRESS NOTES
Pt remained in his room for the shift. He presents with continued withdrawal symptoms, primarily nausea, perspiration, shakiness, and lack of appetite. Pt did not eat breakfast, but was able to eat part of his lunch without issue.    MSSA: 15 at 0815 (10 mg valium given); 14 at 0915 (10 mg valium given); 7 at 1030 (no valium given, per protocol); 6 at 1327 (no valium given, per protocol).     Due to elevated BP at 186/95, provider approved 0.1 mg clonidine prn q4h for systolic BP >160. One dose given at 0957. BP at 1319 178/86. Too early for additional dose of clonidine. Will continue to assess.

## 2019-08-06 NOTE — ED PROVIDER NOTES
History     Chief Complaint   Patient presents with     Addiction Problem     drinks one ffth of abimbola per day. Requesting detox, has hx of seizures.      Suicidal     Also reports SI thoughts, stated he attempted to hang himself with  gas hose a few days ago.     HPI  Merrill Corcoran is a 48 year old male who presents to the emergency department with concern for suicidal patient as well as alcohol dependence.  Patient reports a history of alcohol abuse and dependence for the past 13 months.  He states that he has been drinking 1/5 of vodka per day.  He states that he gets tremulous when he does not drink.  He also states that he has had alcohol withdrawal seizures in the past and may have had one as recently as a ago.  The patient states that he wishes to no longer drinking would like detox.  Additionally, the patient reports a history of schizoaffective disorder.  He has not been taking any medications for at least a year.  The patient reports that he attempted to kill himself by hanging within the past week.  The patient states that he wrapped a hose around his neck and tried to hang himself over the top of the door but that attempt was unsuccessful.  Patient denies any injury as a result.  Denies any sore throat, neck swelling, neck pain, or difficulty swallowing.  Patient denies any recent fall or injury.  He reports occasional nosebleed.  He also reports that he did have some blood in the toilet tissue intermittently over the past several months.  He denies any abdominal pain.  No nausea or vomiting.  No melena.  The patient does report cigarette smoking.  He denies any chest pain or dyspnea.    I have reviewed the Medications, Allergies, Past Medical and Surgical History, and Social History in the Epic system.    Review of Systems   Constitutional: Negative for fever.   HENT: Negative for congestion.    Eyes: Negative for redness.   Respiratory: Negative for shortness of breath.    Cardiovascular:  "Negative for chest pain.   Gastrointestinal: Negative for abdominal pain, nausea and vomiting.   Genitourinary: Negative for difficulty urinating.   Musculoskeletal: Negative for arthralgias and neck stiffness.   Skin: Negative for color change.   Neurological: Negative for headaches.   Psychiatric/Behavioral: Positive for dysphoric mood and self-injury. Negative for confusion.   All other systems reviewed and are negative.      Physical Exam   BP: (!) 188/97  Pulse: 81  Heart Rate: 98  Temp: 98.6  F (37  C)  Resp: 16  Height: 190.5 cm (6' 3\")  Weight: 102.1 kg (225 lb)  SpO2: 100 %      Physical Exam   Constitutional: He is oriented to person, place, and time. He appears well-developed and well-nourished. No distress.   HENT:   Head: Normocephalic and atraumatic.   Mouth/Throat: Oropharynx is clear and moist.   Eyes: Pupils are equal, round, and reactive to light. No scleral icterus.   Cardiovascular: Normal heart sounds and intact distal pulses.   Pulmonary/Chest: No respiratory distress. He has wheezes (Diffuse expiratory).   Abdominal: Soft. Bowel sounds are normal. There is no tenderness.   Genitourinary: Rectal exam shows guaiac negative stool (brown stool).   Musculoskeletal: Normal range of motion. He exhibits no edema or tenderness.   Neurological: He is alert and oriented to person, place, and time. He has normal strength. No cranial nerve deficit. Coordination normal.   Skin: Skin is warm. No rash noted. He is not diaphoretic.   Psychiatric: He exhibits a depressed mood. He expresses suicidal ideation. He expresses suicidal plans.   Nursing note and vitals reviewed.      ED Course        Procedures            Critical Care time:    Results for orders placed or performed during the hospital encounter of 08/05/19   Drug abuse screen 6 urine (tox)   Result Value Ref Range    Amphetamine Qual Urine Negative NEG^Negative    Barbiturates Qual Urine Negative NEG^Negative    Benzodiazepine Qual Urine Positive (A) " NEG^Negative    Cannabinoids Qual Urine Negative NEG^Negative    Cocaine Qual Urine Negative NEG^Negative    Ethanol Qual Urine Positive (A) NEG^Negative    Opiates Qualitative Urine Negative NEG^Negative   CBC with platelets differential   Result Value Ref Range    WBC 6.4 4.0 - 11.0 10e9/L    RBC Count 4.24 (L) 4.4 - 5.9 10e12/L    Hemoglobin 14.3 13.3 - 17.7 g/dL    Hematocrit 42.5 40.0 - 53.0 %     78 - 100 fl    MCH 33.7 (H) 26.5 - 33.0 pg    MCHC 33.6 31.5 - 36.5 g/dL    RDW 14.3 10.0 - 15.0 %    Platelet Count 280 150 - 450 10e9/L    Diff Method Automated Method     % Neutrophils 34.1 %    % Lymphocytes 50.5 %    % Monocytes 9.5 %    % Eosinophils 4.5 %    % Basophils 1.1 %    % Immature Granulocytes 0.3 %    Nucleated RBCs 0 0 /100    Absolute Neutrophil 2.2 1.6 - 8.3 10e9/L    Absolute Lymphocytes 3.2 0.8 - 5.3 10e9/L    Absolute Monocytes 0.6 0.0 - 1.3 10e9/L    Absolute Eosinophils 0.3 0.0 - 0.7 10e9/L    Absolute Basophils 0.1 0.0 - 0.2 10e9/L    Abs Immature Granulocytes 0.0 0 - 0.4 10e9/L    Absolute Nucleated RBC 0.0    Comprehensive metabolic panel   Result Value Ref Range    Sodium 144 133 - 144 mmol/L    Potassium 3.8 3.4 - 5.3 mmol/L    Chloride 107 94 - 109 mmol/L    Carbon Dioxide 29 20 - 32 mmol/L    Anion Gap 8 3 - 14 mmol/L    Glucose 87 70 - 99 mg/dL    Urea Nitrogen 9 7 - 30 mg/dL    Creatinine 0.76 0.66 - 1.25 mg/dL    GFR Estimate >90 >60 mL/min/[1.73_m2]    GFR Estimate If Black >90 >60 mL/min/[1.73_m2]    Calcium 8.5 8.5 - 10.1 mg/dL    Bilirubin Total 0.4 0.2 - 1.3 mg/dL    Albumin 3.6 3.4 - 5.0 g/dL    Protein Total 7.6 6.8 - 8.8 g/dL    Alkaline Phosphatase 93 40 - 150 U/L    ALT 47 0 - 70 U/L    AST 41 0 - 45 U/L   Magnesium   Result Value Ref Range    Magnesium 2.1 1.6 - 2.3 mg/dL   Alcohol breath test POCT   Result Value Ref Range    Alcohol Breath Test 0.218 (A) 0.00 - 0.01             Medications   diazepam (VALIUM) tablet 5-20 mg (has no administration in time range)    vitamin B1 (THIAMINE) tablet 100 mg (has no administration in time range)   folic acid (FOLVITE) tablet 1 mg (has no administration in time range)   multivitamin w/minerals (THERA-VIT-M) tablet 1 tablet (has no administration in time range)   gabapentin (NEURONTIN) capsule 300 mg (has no administration in time range)   ipratropium - albuterol 0.5 mg/2.5 mg/3 mL (DUONEB) neb solution 3 mL (3 mLs Nebulization Given 8/5/19 2138)   0.9% sodium chloride BOLUS (0 mLs Intravenous Stopped 8/5/19 2169)      11:13 PM Lungs CTA bilaterally.  Patient endorses ongoing suicidal ideation.       Assessments & Plan (with Medical Decision Making)   48 year old male with history of alcohol abuse and dependence as well as schizoaffective disorder here complaining of suicide ideations.  He reports that he attempted to hang himself a few days ago.  He endorses ongoing suicidal ideations throughout his emergency department course, even after he was allowed to sober for several hours.  The patient initially had some wheezing.  He does smoke.  His wheezing resolved after 1 DuoNeb.  The patient's basic labs are normal.  The patient also complained of some intermittent rectal bleeding but has no blood on his rectal exam here in the emergency department and has a normal hemoglobin.  The patient appears medically stable for psychiatric admission.    I have reviewed the nursing notes.    I have reviewed the findings, diagnosis, plan and need for follow up with the patient.       Medication List      There are no discharge medications for this visit.         Final diagnoses:   Suicide ideation   Alcohol dependence with uncomplicated intoxication (H)       8/5/2019   [facility], [place], EMERGENCY DEPARTMENT     Destin Murphy MD  08/06/19 0136

## 2019-08-06 NOTE — H&P
"[facility]  Psychiatric History and Physical      Patient name: Merrill Corcoran   MRN: [id number]    Age: 48 year old    YOB: 1970    Identifying information:   The patient is a 48 year old  male who was at a Social Security disability and resides independently in his own apartment.    Chief complaint:  \"I see myself heading in that direction again and I do not want to go there.\"    History of present illness: The patient is a history of schizoaffective disorder and alcohol use disorder who presented voluntarily to the emergency room seeking detox from alcohol.  He had reported heavy alcohol usage, estimating 1/5 of abimbola per day over the past 15 months, with significantly heavier usage over the past 7 months.  He had also reported noncompliance with psychotropic medications for over a year and was experiencing depressed mood and suicidal ideation.  He had reported a near suicide attempt prior to his presentation which involved gaining consciousness after a blackout to find a noose wrapped around his neck, assumed to be planning to hang himself while intoxicated with alcohol.  Noting concern for this recent intensity, he decided to seek help in the emergency room.  No acute psychosocial stressors reported however he did identify ongoing stressors involving discord with the majority of his family due to his alcohol use.  His treatment goals are to detox from alcohol, regain mental health stability, and pursue residential treatment options.    Psychiatric Review of Systems:    -- Depressive episode: Mood is depressed, characterized as severe, accompanied with low energy, low appetite, anhedonia, feels helpless at times, felt hopeless prior to admission.  Suicidal thoughts are present however he feels safe in the hospital.  No homicidal thoughts reported.  -- Lola: He describes moments of lola involving periods of elevated mood, decreased need for sleep, " impulsivity, and goal directed behaviors.  -- Psychosis: He notes intermittent moments of paranoia and auditory hallucinations, describing a running dialogue and commentary, without command hallucinations.  -- Anxiety: denies symptoms corresponding to RACHAEL or panic disorder  -- PTSD: denies symptoms  -- OCD: denies  symptoms  -- Eating disorder: denies symptoms    Psychiatric History:    The patient identifies a history of schizoaffective disorder.  He was last hospitalized approximately 18 years ago.  He identified multiple prior suicide attempts involving attempted hanging's, overdosing, and contemplating jumping off of a high structure.  He currently does not have any outpatient mental health providers and has been off of psychotropic medications for at least a year.  He recalls a history of being treated with ECT, lithium, Risperdal, Abilify, and possibly others.  He experienced side effects to Risperdal however tolerated Abilify well.  He responded to ECT fairly well.    Substance Use History:    Drug of choice is alcohol with pattern of usage corresponding to dependence.  He identified progressive usage, loss of control over usage, functional and relational conflicts related to his use.  He identified withdrawal symptoms which have involved suspected seizures although not confirmed.  Other reported complications include a history of DTs.  He has been admitted to detox and treatment however his last treatment was over 15 years ago.  His longest period of sobriety was about 7 years after he received ECT.  He had a brief bout of heavy methamphetamine usage a couple of years ago with no other illicit substance use reported.    Medical History: No active issues reported.      No current facility-administered medications on file prior to encounter.   Current Outpatient Medications on File Prior to Encounter:  aspirin (ASA) 325 MG EC tablet Take 325 mg by mouth every 6 hours as needed for other (headache)   gabapentin  "(NEURONTIN) 300 MG capsule Take 300 mg by mouth 2 times daily         Social History:  Refer to the psychosocial assessment completed by our .  He recalls that he was adopted in 1970.  He had performance difficulty in school noting that he was diagnosed with a learning disability and dyslexia.  He recalls IQ testing being conducted and that it was in the low normal range.  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  He was homeless during his early 20s to mid 30s.  He now resides independently in his own apartment.     Family History:    Unknown as the patient is adopted.    Medical review of systems: 10 systems were reviewed and positive for psychiatric symptoms as noted above otherwise negative    Physical Exam:    B/P: 178/86[Left arm; pt lying down[, T: 98.4, P: 61, R: 18  Estimated body mass index is 29.45 kg/m  as calculated from the following:    Height as of this encounter: 1.905 m (6' 3\").    Weight as of this encounter: 106.9 kg (235 lb 9.6 oz).    The rest of the physical examination was reviewed in the emergency room note completed by the emergency room physician.      Mental status examination:  Appearance:  Alert, poor hygiene, no acute distress  Attitude:  Attempts to be cooperative  Eye Contact: Fair  Mood:  Depressed  Affect: Mood congruent and blunted  Speech:  Normal rates, tone, latency, volume. Not pushed or pressured.  Psychomotor Behavior:  No psychomotor abnormalities noted  Thought Process: Linear and logical; not tangential or circumstantial or disorganized  Associations:  Logical; no loose associations Noted  Thought Content:  No obvious paranoia, delusions, ideas of reference, or grandiosity noted. Denies auditory or visual hallucinations.  Endorsed suicidal Ideations. Denies homicidal ideations.  Insight:  Fair  Judgment:  Fair  Oriented to:  time, person, and place  Attention Span and Concentration:  Intact  Recent and Remote " Memory: Intact based on interviewing and details provided  Language: Appropriate based on interviewing  Fund of Knowledge: Appropriate based on interviewing  Muscle Strength and Tone: Normal upon visual inspection  Gait and Station: Normal upon visual inspection            Diagnoses:    Schizoaffective disorder-bipolar type  Alcohol use disorder, severe     Plan:  1.  The patient has been admitted to our behavioral health unit under voluntary status for reports of depressed mood and suicidal thoughts in the context of severe alcohol usage.  Treatment will be continued voluntarily and his care will be resumed by Dr. Munoz tomorrow.    2.  Continue MSSA protocol with Valium for management of alcohol withdrawal symptoms.  The patient is agreeable to start Abilify targeting mood stabilization and reduction of paranoia associated with his schizoaffective disorder.  Medication will be introduced at 5 mg daily and titrated up as indicated to address targeted symptoms.  Risks and benefits were reviewed and the patient consented to treatment as outlined in this document.    3. Psychosocial treatments to be addressed with social work consult and groups.  We will assist the patient and acquiring a chemical health assessment to explore available treatment options.  He is interested in pursuing residential treatment if available.    4.  Anticipate a hospital stay of approximately 1 week as we target improvement in mood and remission of suicidal thoughts while formulating a plan of care to effectively transition his care out of the hospital.

## 2019-08-07 LAB
ALBUMIN SERPL-MCNC: 2.9 G/DL (ref 3.4–5)
ALP SERPL-CCNC: 92 U/L (ref 40–150)
ALT SERPL W P-5'-P-CCNC: 33 U/L (ref 0–70)
ANION GAP SERPL CALCULATED.3IONS-SCNC: 5 MMOL/L (ref 3–14)
AST SERPL W P-5'-P-CCNC: 23 U/L (ref 0–45)
BASOPHILS # BLD AUTO: 0.1 10E9/L (ref 0–0.2)
BASOPHILS NFR BLD AUTO: 1.1 %
BILIRUB DIRECT SERPL-MCNC: 0.2 MG/DL (ref 0–0.2)
BILIRUB SERPL-MCNC: 0.9 MG/DL (ref 0.2–1.3)
BUN SERPL-MCNC: 11 MG/DL (ref 7–30)
CALCIUM SERPL-MCNC: 8.1 MG/DL (ref 8.5–10.1)
CHLORIDE SERPL-SCNC: 109 MMOL/L (ref 94–109)
CHOLEST SERPL-MCNC: 231 MG/DL
CO2 SERPL-SCNC: 27 MMOL/L (ref 20–32)
CREAT SERPL-MCNC: 0.77 MG/DL (ref 0.66–1.25)
DIFFERENTIAL METHOD BLD: ABNORMAL
EOSINOPHIL # BLD AUTO: 0.2 10E9/L (ref 0–0.7)
EOSINOPHIL NFR BLD AUTO: 4.3 %
ERYTHROCYTE [DISTWIDTH] IN BLOOD BY AUTOMATED COUNT: 13.2 % (ref 10–15)
GFR SERPL CREATININE-BSD FRML MDRD: >90 ML/MIN/{1.73_M2}
GLUCOSE SERPL-MCNC: 103 MG/DL (ref 70–99)
HCT VFR BLD AUTO: 44.4 % (ref 40–53)
HDLC SERPL-MCNC: 68 MG/DL
HGB BLD-MCNC: 14.8 G/DL (ref 13.3–17.7)
IMM GRANULOCYTES # BLD: 0 10E9/L (ref 0–0.4)
IMM GRANULOCYTES NFR BLD: 0.2 %
LDLC SERPL CALC-MCNC: 132 MG/DL
LYMPHOCYTES # BLD AUTO: 1.6 10E9/L (ref 0.8–5.3)
LYMPHOCYTES NFR BLD AUTO: 30 %
MCH RBC QN AUTO: 33.6 PG (ref 26.5–33)
MCHC RBC AUTO-ENTMCNC: 33.3 G/DL (ref 31.5–36.5)
MCV RBC AUTO: 101 FL (ref 78–100)
MONOCYTES # BLD AUTO: 0.6 10E9/L (ref 0–1.3)
MONOCYTES NFR BLD AUTO: 11.4 %
NEUTROPHILS # BLD AUTO: 2.8 10E9/L (ref 1.6–8.3)
NEUTROPHILS NFR BLD AUTO: 53 %
NONHDLC SERPL-MCNC: 163 MG/DL
NRBC # BLD AUTO: 0 10*3/UL
NRBC BLD AUTO-RTO: 0 /100
PLATELET # BLD AUTO: 227 10E9/L (ref 150–450)
POTASSIUM SERPL-SCNC: 4.2 MMOL/L (ref 3.4–5.3)
PROT SERPL-MCNC: 6.5 G/DL (ref 6.8–8.8)
RBC # BLD AUTO: 4.4 10E12/L (ref 4.4–5.9)
SODIUM SERPL-SCNC: 141 MMOL/L (ref 133–144)
TRIGL SERPL-MCNC: 153 MG/DL
TSH SERPL DL<=0.005 MIU/L-ACNC: 2.08 MU/L (ref 0.4–4)
WBC # BLD AUTO: 5.3 10E9/L (ref 4–11)

## 2019-08-07 PROCEDURE — 12400001 ZZH R&B MH UMMC

## 2019-08-07 PROCEDURE — 80048 BASIC METABOLIC PNL TOTAL CA: CPT | Performed by: PSYCHIATRY & NEUROLOGY

## 2019-08-07 PROCEDURE — 84443 ASSAY THYROID STIM HORMONE: CPT | Performed by: PSYCHIATRY & NEUROLOGY

## 2019-08-07 PROCEDURE — 36415 COLL VENOUS BLD VENIPUNCTURE: CPT | Performed by: PSYCHIATRY & NEUROLOGY

## 2019-08-07 PROCEDURE — 80061 LIPID PANEL: CPT | Performed by: PSYCHIATRY & NEUROLOGY

## 2019-08-07 PROCEDURE — 80076 HEPATIC FUNCTION PANEL: CPT | Performed by: PSYCHIATRY & NEUROLOGY

## 2019-08-07 PROCEDURE — 25000132 ZZH RX MED GY IP 250 OP 250 PS 637: Performed by: PSYCHIATRY & NEUROLOGY

## 2019-08-07 PROCEDURE — 25000132 ZZH RX MED GY IP 250 OP 250 PS 637: Performed by: EMERGENCY MEDICINE

## 2019-08-07 PROCEDURE — 85025 COMPLETE CBC W/AUTO DIFF WBC: CPT | Performed by: PSYCHIATRY & NEUROLOGY

## 2019-08-07 PROCEDURE — 99232 SBSQ HOSP IP/OBS MODERATE 35: CPT | Performed by: PSYCHIATRY & NEUROLOGY

## 2019-08-07 PROCEDURE — HZ2ZZZZ DETOXIFICATION SERVICES FOR SUBSTANCE ABUSE TREATMENT: ICD-10-PCS | Performed by: PSYCHIATRY & NEUROLOGY

## 2019-08-07 PROCEDURE — G0177 OPPS/PHP; TRAIN & EDUC SERV: HCPCS

## 2019-08-07 RX ADMIN — CLONIDINE HYDROCHLORIDE 0.1 MG: 0.1 TABLET ORAL at 05:39

## 2019-08-07 RX ADMIN — GABAPENTIN 300 MG: 300 CAPSULE ORAL at 19:48

## 2019-08-07 RX ADMIN — MULTIPLE VITAMINS W/ MINERALS TAB 1 TABLET: TAB at 08:11

## 2019-08-07 RX ADMIN — DIAZEPAM 5 MG: 5 TABLET ORAL at 08:11

## 2019-08-07 RX ADMIN — THIAMINE HCL TAB 100 MG 100 MG: 100 TAB at 08:11

## 2019-08-07 RX ADMIN — HYDROXYZINE HYDROCHLORIDE 25 MG: 25 TABLET, FILM COATED ORAL at 11:09

## 2019-08-07 RX ADMIN — FOLIC ACID 1 MG: 1 TABLET ORAL at 08:11

## 2019-08-07 RX ADMIN — GABAPENTIN 300 MG: 300 CAPSULE ORAL at 14:06

## 2019-08-07 RX ADMIN — GABAPENTIN 300 MG: 300 CAPSULE ORAL at 08:11

## 2019-08-07 RX ADMIN — DIAZEPAM 10 MG: 5 TABLET ORAL at 19:49

## 2019-08-07 RX ADMIN — DIAZEPAM 10 MG: 5 TABLET ORAL at 11:09

## 2019-08-07 RX ADMIN — OLANZAPINE 10 MG: 10 TABLET, FILM COATED ORAL at 21:01

## 2019-08-07 RX ADMIN — CLONIDINE HYDROCHLORIDE 0.1 MG: 0.1 TABLET ORAL at 11:09

## 2019-08-07 ASSESSMENT — ACTIVITIES OF DAILY LIVING (ADL)
ORAL_HYGIENE: INDEPENDENT
HYGIENE/GROOMING: INDEPENDENT
DRESS: SCRUBS (BEHAVIORAL HEALTH);INDEPENDENT
LAUNDRY: WITH SUPERVISION

## 2019-08-07 NOTE — PLAN OF CARE
BEHAVIORAL TEAM DISCUSSION    Participants: Tristen Juan (CTC), Aviva Mcconnell (OT), Preston Estrada (RN)  Progress: Continuing to assess.   Continued Stay Criteria/Rationale: Assessment, evaluation, and recommendations.   Medical/Physical: Elevated Blood Pressure.   Precautions:   Behavioral Orders   Procedures     Code 1 - Restrict to Unit     Routine Programming     As clinically indicated     Seizure precautions     Status 15     Every 15 minutes.     Withdrawal precautions     Plan: The plan is to assess the patient for mental health and medication needs. The patient will be prescribed medications to treat the identified symptoms. Patient will participate in therapeutic skill building groups on the unit. CTC to coordinate discharge/after care planning.     Rationale for change in precautions or plan: None.

## 2019-08-07 NOTE — PROGRESS NOTES
08/07/19 1225   General Information   Date Initially Attended OT 08/07/19     Pt's first attendance in Occupational Therapy Clinic. Pt Response: Content and cooperative. Required min assist to initiate and gather materials. I to sequence and adjust to workspace demands as needed.  Demonstrating fair focus, planning, and problem solving. Able to ask for assistance as needed and appeared comfortable in the presence of peers and staff.     OT staff will meet with pt to review the role of occupational therapy and explain the value of having them involved in their treatment plan including options to meet current needs/self-identified goals. As group attendance is established, Pt will be given self-assessment to inform OT initial assessment.

## 2019-08-07 NOTE — PROGRESS NOTES
Writer provided the patient with CD assessment paperwork to complete for a CD assessment. The completed paperwork has been placed in the patient's paper chart.

## 2019-08-07 NOTE — PLAN OF CARE
"Pt denies SI/SIB or hallucinations. Pt states that he is angry, agitated and impatient and doesn't know why. Writer asked do you believe it's the withdrawal you are going through? Pt responded \"yes\" and stated he is normally not like this. Pt apologized to a staff member for being this way, which was acknowledge. Pt scored an 8 and 14 on the MSSA scale and received 8 and 10 mg respectively. Pt also received PRN clonidine 0.1 mg at 1100 for his elevated BP at 161/91. Also given was PRN hydroxyzine 25 mg along with the 10 mg of Valium.    Pt scored a 5 on MSSA later on and VS were 109/68, 54, 98.5. Writer encouraged pt to drink more fluids as he goes through withdrawal. Pt acknowledged and is napping in his room now.  "

## 2019-08-07 NOTE — PLAN OF CARE
Patient was isolative to room withdrawn, continues to have elevated systolic blood pressure above 160, gave clonidine 0.1 mg at 1842 also scored 8 on the MSSA due to agitation and reported sweats and visible tremors, gave Valium of 5 mg 1642, patient said it was effective,  denies SI/SIB, no reported hallucinations, ate dinner well, went to bed early, will continue to monitor for changes in condition.

## 2019-08-07 NOTE — PROGRESS NOTES
Pt slept well this shift; Presents with slight shakiness to his hands, no sweating noted.  MSSA scored 6 at 0100 (no valium given, per protocol). At 0500 MSSA = 6; BP noted to be high 180/93 -  Clonidine given as ordered. Will continue to monitor and offer support.

## 2019-08-07 NOTE — PROGRESS NOTES
[facility], [place]   Psychiatric Progress Note        Interim History:     The patient's care was discussed with the treatment team during the daily team meeting and/or staff's chart notes were reviewed.  Staff report patient is irritable and visibly shaky, needed Valium. During today visit with me he firmly stated that he would like to stop drinking and would go to CD treatment for that. Reported most recent drinking pattern of a half of gallon of liquor daily, not socializing with peers, being estranged from family because of his drinking. Reported strong suicidal thoughts with plan to hang himself prior to admission, but denied having Suicidal ideation now. Said that he was hopeful that CD treatment would help him to achieve and maintain sobriety. He reported that at times he hears voices of familiar and unfamiliar people who talk to him and he answers. He denied that these people tell him very bizarre things, said that, in general, they give him advises. He, most of the time, understands that voices are imaginary, but frequently they seem to be very real.          Medications:       folic acid  1 mg Oral Daily     gabapentin  300 mg Oral TID     multivitamin w/minerals  1 tablet Oral Daily     vitamin B1  100 mg Oral Daily          Allergies:   No Known Allergies       Labs:     Recent Results (from the past 24 hour(s))   CBC with platelets differential    Collection Time: 08/07/19  7:32 AM   Result Value Ref Range    WBC 5.3 4.0 - 11.0 10e9/L    RBC Count 4.40 4.4 - 5.9 10e12/L    Hemoglobin 14.8 13.3 - 17.7 g/dL    Hematocrit 44.4 40.0 - 53.0 %     (H) 78 - 100 fl    MCH 33.6 (H) 26.5 - 33.0 pg    MCHC 33.3 31.5 - 36.5 g/dL    RDW 13.2 10.0 - 15.0 %    Platelet Count 227 150 - 450 10e9/L    Diff Method Automated Method     % Neutrophils 53.0 %    % Lymphocytes 30.0 %    % Monocytes 11.4 %    % Eosinophils 4.3 %    % Basophils 1.1 %    % Immature Granulocytes 0.2 %     "Nucleated RBCs 0 0 /100    Absolute Neutrophil 2.8 1.6 - 8.3 10e9/L    Absolute Lymphocytes 1.6 0.8 - 5.3 10e9/L    Absolute Monocytes 0.6 0.0 - 1.3 10e9/L    Absolute Eosinophils 0.2 0.0 - 0.7 10e9/L    Absolute Basophils 0.1 0.0 - 0.2 10e9/L    Abs Immature Granulocytes 0.0 0 - 0.4 10e9/L    Absolute Nucleated RBC 0.0    Basic metabolic panel    Collection Time: 08/07/19  7:32 AM   Result Value Ref Range    Sodium 141 133 - 144 mmol/L    Potassium 4.2 3.4 - 5.3 mmol/L    Chloride 109 94 - 109 mmol/L    Carbon Dioxide 27 20 - 32 mmol/L    Anion Gap 5 3 - 14 mmol/L    Glucose 103 (H) 70 - 99 mg/dL    Urea Nitrogen 11 7 - 30 mg/dL    Creatinine 0.77 0.66 - 1.25 mg/dL    GFR Estimate >90 >60 mL/min/[1.73_m2]    GFR Estimate If Black >90 >60 mL/min/[1.73_m2]    Calcium 8.1 (L) 8.5 - 10.1 mg/dL   Lipid panel    Collection Time: 08/07/19  7:32 AM   Result Value Ref Range    Cholesterol 231 (H) <200 mg/dL    Triglycerides 153 (H) <150 mg/dL    HDL Cholesterol 68 >39 mg/dL    LDL Cholesterol Calculated 132 (H) <100 mg/dL    Non HDL Cholesterol 163 (H) <130 mg/dL   Hepatic panel    Collection Time: 08/07/19  7:32 AM   Result Value Ref Range    Bilirubin Direct 0.2 0.0 - 0.2 mg/dL    Bilirubin Total 0.9 0.2 - 1.3 mg/dL    Albumin 2.9 (L) 3.4 - 5.0 g/dL    Protein Total 6.5 (L) 6.8 - 8.8 g/dL    Alkaline Phosphatase 92 40 - 150 U/L    ALT 33 0 - 70 U/L    AST 23 0 - 45 U/L   TSH with free T4 reflex and/or T3 as indicated    Collection Time: 08/07/19  7:32 AM   Result Value Ref Range    TSH 2.08 0.40 - 4.00 mU/L          Psychiatric Examination:     /68   Pulse 54   Temp 98.5  F (36.9  C) (Oral)   Resp 16   Ht 1.905 m (6' 3\")   Wt 106.9 kg (235 lb 9.6 oz)   SpO2 99%   BMI 29.45 kg/m    Weight is 235 lbs 9.6 oz  Body mass index is 29.45 kg/m .  Orthostatic Vitals       Most Recent      Lying Orthostatic /82  Comment: left arm 08/06 1043    Lying Orthostatic Pulse (bpm) 61 08/06 1043          Appearance: " dressed in hospital scrubs and poorly groomed  Attitude:  cooperative  Eye Contact:  fair  Mood:  angry and depressed  Affect:  constricted mobility  Speech:  slurred, due to poor dentiition  Psychomotor Behavior:  physical agitation and visible upper extremities tremor  Throught Process:  logical, linear and goal oriented  Associations:  no loose associations  Thought Content:  auditory hallucinations present, See discussion above.   Insight:  partial  Judgement:  fair  Oriented to:  time, person, and place  Attention Span and Concentration:  fair  Recent and Remote Memory:  fair    Clinical Global Impressions  First:  Considering your total clinical experience with this particular patient population, how severe are the patient's symptoms at this time?: 7 (08/06/19 2120)  Compared to the patient's condition at the START of treatment, this patient's condition is:: 4 (08/06/19 2120)  Most recent:  Considering your total clinical experience with this particular patient population, how severe are the patient's symptoms at this time?:   Compared to the patient's condition at the START of treatment, this patient's condition is:          Precautions:     Behavioral Orders   Procedures     Code 1 - Restrict to Unit     Routine Programming     As clinically indicated     Seizure precautions     Status 15     Every 15 minutes.     Withdrawal precautions          DIagnoses:     Schizoaffective disorder-bipolar type  Alcohol use disorder, severe with withdrawal.       Plan:     Will continue on MSSA protocol. No medication changes today. Will request CD and nutrition services consults (patient may need soft food diet due to poor dentition). After stabilization he will go to CD treatment.

## 2019-08-08 PROCEDURE — 12400001 ZZH R&B MH UMMC

## 2019-08-08 PROCEDURE — G0177 OPPS/PHP; TRAIN & EDUC SERV: HCPCS

## 2019-08-08 PROCEDURE — 25000132 ZZH RX MED GY IP 250 OP 250 PS 637: Performed by: EMERGENCY MEDICINE

## 2019-08-08 PROCEDURE — 25000132 ZZH RX MED GY IP 250 OP 250 PS 637: Performed by: PSYCHIATRY & NEUROLOGY

## 2019-08-08 PROCEDURE — 99232 SBSQ HOSP IP/OBS MODERATE 35: CPT | Performed by: PSYCHIATRY & NEUROLOGY

## 2019-08-08 PROCEDURE — H2032 ACTIVITY THERAPY, PER 15 MIN: HCPCS

## 2019-08-08 PROCEDURE — 40000007 ZZH STATISTIC ADULT CD FACE TO FACE-NO CHRG

## 2019-08-08 RX ADMIN — FOLIC ACID 1 MG: 1 TABLET ORAL at 09:17

## 2019-08-08 RX ADMIN — HYDROXYZINE HYDROCHLORIDE 25 MG: 25 TABLET, FILM COATED ORAL at 09:17

## 2019-08-08 RX ADMIN — GABAPENTIN 300 MG: 300 CAPSULE ORAL at 21:22

## 2019-08-08 RX ADMIN — HYDROXYZINE HYDROCHLORIDE 25 MG: 25 TABLET, FILM COATED ORAL at 13:31

## 2019-08-08 RX ADMIN — DIAZEPAM 5 MG: 5 TABLET ORAL at 13:31

## 2019-08-08 RX ADMIN — DIAZEPAM 5 MG: 5 TABLET ORAL at 01:09

## 2019-08-08 RX ADMIN — GABAPENTIN 300 MG: 300 CAPSULE ORAL at 09:17

## 2019-08-08 RX ADMIN — GABAPENTIN 300 MG: 300 CAPSULE ORAL at 13:31

## 2019-08-08 RX ADMIN — DIAZEPAM 10 MG: 5 TABLET ORAL at 16:00

## 2019-08-08 RX ADMIN — THIAMINE HCL TAB 100 MG 100 MG: 100 TAB at 09:17

## 2019-08-08 RX ADMIN — DIAZEPAM 5 MG: 5 TABLET ORAL at 09:17

## 2019-08-08 RX ADMIN — MULTIPLE VITAMINS W/ MINERALS TAB 1 TABLET: TAB at 09:17

## 2019-08-08 ASSESSMENT — ACTIVITIES OF DAILY LIVING (ADL)
HYGIENE/GROOMING: INDEPENDENT
HYGIENE/GROOMING: INDEPENDENT
LAUNDRY: WITH SUPERVISION
ORAL_HYGIENE: INDEPENDENT
DRESS: INDEPENDENT
DRESS: INDEPENDENT
ORAL_HYGIENE: INDEPENDENT

## 2019-08-08 ASSESSMENT — MIFFLIN-ST. JEOR: SCORE: 2006.14

## 2019-08-08 NOTE — PROGRESS NOTES
"CLINICAL NUTRITION SERVICES - ASSESSMENT NOTE     Nutrition Prescription    RECOMMENDATIONS FOR MDs/PROVIDERS TO ORDER:  None at this time    Malnutrition Status:    Patient does not meet two of the above criteria necessary for diagnosing malnutrition but is at risk for malnutrition    Recommendations already ordered by Registered Dietitian (RD):  - Change diet to mechanically altered     Future/Additional Recommendations:  - If PO intake declines, consider ordering supplements      REASON FOR ASSESSMENT  Merrill Corcoran is a/an 48 year old male assessed by the dietitian for Provider Order - evaluate for need for soft food diet.    NUTRITION HISTORY  Per chart review: The patient is a history of schizoaffective disorder and alcohol use disorder who presented voluntarily to the emergency room seeking detox from alcohol. The pt also has calculus of kidney, hepatitis C carrier, and psoriasis.     Per pt report: The pt has been heavily drinking for the last 13 months and he doesn't have much of an appetite when he drinks. When sober, the pt would eat TID meals but for the last year, has only eating 1 meal every 2 days. During this meal, he may have pizza, garlic bread, soup, chili, or go out to [employer]. Merrill estimated that he ate out 3-4 times per week. Through out the day he may drink 45 oz of pop, tomato juice, OJ, and about 1/2 gallon of hard alcohol.       CURRENT NUTRITION ORDERS  Diet: Regular  Intake/Tolerance: The patient now has a good appetite but struggles to find food that he can eat with out teeth. The pt has dentures but does not know where they are. He is open to trying a mechanically altered diet. Merrill estimates that he is eating 50-75% of food on trays d/t inability to masticate certain foods without teeth.      LABS  Labs reviewed    MEDICATIONS  Medications reviewed  -Folic acid  -Thera-vit-M  -Thiamine     ANTHROPOMETRICS  Height: 190.5 cm (6' 3\")  Most Recent Weight: 106.9 kg (235 lb 9.6 oz)  "   IBW: 89.1 kg (120%)  BMI: 29.5 Overweight BMI 25-29.9  Weight History: The pt's wt has remained stable over the last 4.5 months  Wt Readings from Last 10 Encounters:   08/06/19 106.9 kg (235 lb 9.6 oz)   05/05/19  105 kg (231 lb 8 oz) (CE)   03/20/19 106.6 kg (235 lb)     Dosing Weight: 107 kg (actual)    ASSESSED NUTRITION NEEDS  Estimated Energy Needs: 6355-6935 kcals/day (25 - 30 kcals/kg)  Justification: Maintenance  Estimated Protein Needs:  grams protein/day (0.8 - 1 grams of pro/kg)  Justification: Maintenance  Estimated Fluid Needs: (1 mL/kcal)   Justification: Maintenance    PHYSICAL FINDINGS  See malnutrition section below.   Poor dentition.     MALNUTRITION  % Intake: </=75% for >/= 1 month (severe)  % Weight Loss: None noted  Subcutaneous Fat Loss: None observed  Muscle Loss: None observed  Fluid Accumulation/Edema: None noted  Malnutrition Diagnosis: Patient does not meet two of the above criteria necessary for diagnosing malnutrition but is at risk for malnutrition    NUTRITION DIAGNOSIS  Inadequate oral intake related to difficulty eating d/t poor dentition as evidenced by the pt consuming 50%-75% of meals for >5 days.       INTERVENTIONS  Implementation  Nutrition Education: Provided education on the importance of adequate intake.  Encouraged pt to eat >75% of TID meals while admitted.    Modify diet order    Goals  Patient to consume % of nutritionally adequate meal trays TID, or the equivalent with supplements/snacks.     Monitoring/Evaluation  Progress toward goals will be monitored and evaluated per protocol.      Tessie Bay RD, LD  Pager: [phone]

## 2019-08-08 NOTE — PROGRESS NOTES
" 08/07/19 2006   Art Therapy   Type of Intervention structured groups   Response refused   Hours NC   Treatment Detail    (Art Therapy) - affirmation   Problem- Schizoaffective disorder-bipolar type  Alcohol use disorder, severe   Goal- process, express, cope and regulate feelings and emotions through Art Therapy directives.     Outcome- Pt. Checked in with group. When asked about an affirmation he could give himself, he said with voice cracking, \"I don't have one, I'm very angry!\" Writer validated his feelings. After the group started exploring art materials, he threw the materials down and walked out of the group without explanation. Writer asked if she could help and he declined and continued to walk out of group.       "

## 2019-08-08 NOTE — PROGRESS NOTES
Patient scored an 8 in the MSSA  at 0015; Slight tremor noted to both hands. Pt was easily arousalable. Valium 5 mg administered per protocol. Denied SI/SIB/AH/VH at this time. Rated his anxiety at 3/10. At 0500 MSSA = 5; No valium per protocol. Will continue to monitor and offer support.

## 2019-08-08 NOTE — PROGRESS NOTES
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 typical day; evening for you. Work, school, social, leisure, volunteer, spiritual practices. Include time spent obtaining, using, recovering from drugs or alcohol. (DSM)     Pt reports drinking all day in the park.     Please describe what leisure activities have been associated with your substance abuse:     The patient denied having any leisure activities which had been associated with his substance abuse.    2B. How often do you spend more time than you planned using or use more than you planned? (DSM)     Pt reports basically every time.     3. How important is using to your social connections? Do many of your family or friends use?     Pt reports he has been using alone.     4A. Are you currently in a significant relationship?     No    4C. Sexual Orientation:     Heterosexual    5A. Who do you live with?      The patient lives alone.    5B. Tell me about their alcohol/drug use and mental health issues.     The patient lives alone.    5C. Are you concerned for your safety there? No    5D. Are you concerned about the safety of anyone else who lives with you? No    6A. Do you have children who live with you?     The patient denied having any children.    6B. Do you have children who do not live with you?      The patient denied having any children.    7A. Who supports you in making changes in your alcohol or drug use? Would you like your family to participate in your treatment? If so, how? What are they willing to do to support you? Who is upset or angry about you making changes in your alcohol or drug use? How big a problem is this for you?     Pt reports he doesn't have a support system at this time has he pushed everyone away.     7B. This table is provided to record information about the person s relationships and available support It is not necessary to ask each item; only to get a comprehensive picture of their support system.  How often can you count on the following people when you need someone?   Partner / Spouse The patient does not have a current partner or spouse.   Parent(s)/Aunt(s)/Uncle(s)/Grandparents Rarely supportive   Sibling(s)/Cousin(s) The patient doesn't have any current contact with siblings.   Child(merlyn) The patient doesn't have any children.   Other relative(s) The patient doesn't have any current contact with other relatives.   Friend(s)/neighbor(s) Rarely supportive   Child(merlyn) s father(s)/mother(s) The patient doesn't have any children.   Support group member(s) The patient denied having any current involvement with 12-step or other support group meetings.   Community of claritza members The patient denied having any current involvement with community claritza members.   /counselor/therapist/healer The patient denied having any current involvement with a , counselor, therapist or healer.   Other (specify) No     8A. What is your current living situation?     Pt reports he lives alone.     8B. What is your long term plan for where you will be living?     Pt is open to sober housing.     8C. Tell me about your living environment/neighborhood? Ask enough follow up questions to determine safety, criminal activity, availability of alcohol and drugs, supportive or  antagonistic to the person making changes.      Pt reports it's close to [employer].     9. Criminal justice history: Gather current/recent history and any significant history related to substance use--Arrests? Convictions? Circumstances? Alcohol or drug involvement? Sentences? Still on probation or parole? Expectations of the court? Current court order? Any sex offenses - lifetime? What level? (DSM)    None    Commitment: 2006 in [place] CD commitment  Registered Sex Offender: Denies    10. What obstacles exist to participating in treatment? (Time off work, childcare, funding, transportation, pending detention time, living situation)     The patient denied having any obstacles for participating in substance abuse treatment.    Dimension VI Ratings   Recovery environment - The placing authority must use the criteria in Dimension VI to determine a client s recovery environment.   RISK DESCRIPTIONS - Severity rating: 3 Client is not engaged in structured, meaningful activity and the client's peers, family, significant other, and living environment are unsupportive, or there is significant criminal justice system involvement.    REASONS SEVERITY WAS ASSIGNED - (What support does the person have for making changes? What structure/stability does the person have in his or her daily life that will increase the likelihood that changes can be sustained? What problems exist in the person s environment that will jeopardize getting/staying clean and sober?)     Patient lives alone in an apartment. Patient is not employed, on SSDI. Pt denies having structured meaningful daily activity. Pt denies being in a romantic relationship. Pt denies having children. Pt denies any past or current legal concerns. Pt reports he was on commitment in [place] in 2006 due to CD issues.          Client Choice/Exceptions   What is your cultural identification?      Would you like services specific to language, age, gender, culture,  Alevism preference, race, ethnicity, sexual orientation or disability?  No    What particular treatment choices and options would you like to have? MI/CD residential     Do you have a preference for a particular treatment program? [facility]     Criteria for Diagnosis     Criteria for Diagnosis  DSM-5 Criteria for Substance Use Disorder  Instructions: Determine whether the client currently meets the criteria for Substance Use Disorder using the diagnostic criteria in the DSM-V pp.481-589. Current means during the most recent 12 months outside a facility that controls access to substances    Category of Substance Severity (ICD-10 Code / DSM 5 Code)     Alcohol Use Disorder Severe  (10.20) (303.90)   Cannabis Use Disorder The patient does not meet the criteria for a Cannabis use disorder.   Hallucinogen Use Disorder The patient does not meet the criteria for a Hallucinogen use disorder.   Inhalant Use Disorder The patient does not meet the criteria for an Inhalant use disorder.   Opioid Use Disorder The patient does not meet the criteria for an Opioid use disorder.   Sedative, Hypnotic, or Anxiolytic Use Disorder The patient does not meet the criteria for a Sedative/Hypnotic use disorder.   Stimulant Related Disorder The patient does not meet the criteria for a Stimulant use disorder.   Tobacco Use Disorder Moderate   (F17.200) (305.1)   Other (or unknown) Substance Use Disorder The patient does not meet the criteria for a Other (or unknown) Substance use disorder.       Collateral Contact Summary   Number of contacts made: 1    Contact with referring person:  No    If court related records were reviewed, summarize here: No court records had been reviewed at the time of this documentation.    Information from collateral contacts supported/largely agreed with information from the client and associated risk ratings.      Rule 25 Assessment Summary and Plan   's Recommendation    1) Abstain from alcohol  "and all illicit drugs and mood altering drugs unless prescribed by a healthcare giver familiar with their addiction.  2) Enter and complete [facility]-occur[facility] or similar and follow counselor recommendations.  3) Arrange for sober supportive living upon discharge.  4) Develop a sober supportive network.  5) Continue to receive appropriate medical and psychiatric services as needed.       Collateral Contacts     Name:    EMR   Relationship:    Medical Records   Phone Number:    None Releases:    Yes     [facility], [place]  Psychiatric History and Physical      Patient name: Merrill Corcoran                        MRN: [id number]    Age: 48 year old                                              YOB: 1970     Identifying information:   The patient is a 48 year old  male who was at a Social Security disability and resides independently in his own apartment.     Chief complaint:  \"I see myself heading in that direction again and I do not want to go there.\"     History of present illness: The patient is a history of schizoaffective disorder and alcohol use disorder who presented voluntarily to the emergency room seeking detox from alcohol.  He had reported heavy alcohol usage, estimating 1/5 of abimbola per day over the past 15 months, with significantly heavier usage over the past 7 months.  He had also reported noncompliance with psychotropic medications for over a year and was experiencing depressed mood and suicidal ideation.  He had reported a near suicide attempt prior to his presentation which involved gaining consciousness after a blackout to find a noose wrapped around his neck, assumed to be planning to hang himself while intoxicated with alcohol.  Noting concern for this recent intensity, he decided to seek help in the emergency room.  No acute psychosocial stressors reported however he did identify " ongoing stressors involving discord with the majority of his family due to his alcohol use.  His treatment goals are to detox from alcohol, regain mental health stability, and pursue residential treatment options.     Psychiatric Review of Systems:    -- Depressive episode: Mood is depressed, characterized as severe, accompanied with low energy, low appetite, anhedonia, feels helpless at times, felt hopeless prior to admission.  Suicidal thoughts are present however he feels safe in the hospital.  No homicidal thoughts reported.  -- Lola: He describes moments of lola involving periods of elevated mood, decreased need for sleep, impulsivity, and goal directed behaviors.  -- Psychosis: He notes intermittent moments of paranoia and auditory hallucinations, describing a running dialogue and commentary, without command hallucinations.  -- Anxiety: denies symptoms corresponding to RACHAEL or panic disorder  -- PTSD: denies symptoms  -- OCD: denies  symptoms  -- Eating disorder: denies symptoms     Psychiatric History:    The patient identifies a history of schizoaffective disorder.  He was last hospitalized approximately 18 years ago.  He identified multiple prior suicide attempts involving attempted hanging's, overdosing, and contemplating jumping off of a high structure.  He currently does not have any outpatient mental health providers and has been off of psychotropic medications for at least a year.  He recalls a history of being treated with ECT, lithium, Risperdal, Abilify, and possibly others.  He experienced side effects to Risperdal however tolerated Abilify well.  He responded to ECT fairly well.     Substance Use History:    Drug of choice is alcohol with pattern of usage corresponding to dependence.  He identified progressive usage, loss of control over usage, functional and relational conflicts related to his use.  He identified withdrawal symptoms which have involved suspected seizures although not confirmed.   "Other reported complications include a history of DTs.  He has been admitted to detox and treatment however his last treatment was over 15 years ago.  His longest period of sobriety was about 7 years after he received ECT.  He had a brief bout of heavy methamphetamine usage a couple of years ago with no other illicit substance use reported.     Medical History: No active issues reported.        No current facility-administered medications on file prior to encounter.   Current Outpatient Medications on File Prior to Encounter:  aspirin (ASA) 325 MG EC tablet Take 325 mg by mouth every 6 hours as needed for other (headache)   gabapentin (NEURONTIN) 300 MG capsule Take 300 mg by mouth 2 times daily          Social History:  Refer to the psychosocial assessment completed by our .  He recalls that he was adopted in 1970.  He had performance difficulty in school noting that he was diagnosed with a learning disability and dyslexia.  He recalls IQ testing being conducted and that it was in the low normal range.  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  He was homeless during his early 20s to mid 30s.  He now resides independently in his own apartment.     Family History:    Unknown as the patient is adopted.     Medical review of systems: 10 systems were reviewed and positive for psychiatric symptoms as noted above otherwise negative     Physical Exam:    B/P: 178/86[Left arm; pt lying down[, T: 98.4, P: 61, R: 18  Estimated body mass index is 29.45 kg/m  as calculated from the following:    Height as of this encounter: 1.905 m (6' 3\").    Weight as of this encounter: 106.9 kg (235 lb 9.6 oz).    The rest of the physical examination was reviewed in the emergency room note completed by the emergency room physician.      Mental status examination:  Appearance:  Alert, poor hygiene, no acute distress  Attitude:  Attempts to be cooperative  Eye Contact: Fair  Mood: "  Depressed  Affect: Mood congruent and blunted  Speech:  Normal rates, tone, latency, volume. Not pushed or pressured.  Psychomotor Behavior:  No psychomotor abnormalities noted  Thought Process: Linear and logical; not tangential or circumstantial or disorganized  Associations:  Logical; no loose associations Noted  Thought Content:  No obvious paranoia, delusions, ideas of reference, or grandiosity noted. Denies auditory or visual hallucinations.  Endorsed suicidal Ideations. Denies homicidal ideations.  Insight:  Fair  Judgment:  Fair  Oriented to:  time, person, and place  Attention Span and Concentration:  Intact  Recent and Remote Memory: Intact based on interviewing and details provided  Language: Appropriate based on interviewing  Fund of Knowledge: Appropriate based on interviewing  Muscle Strength and Tone: Normal upon visual inspection  Gait and Station: Normal upon visual inspection             Diagnoses:    Schizoaffective disorder-bipolar type  Alcohol use disorder, severe     Plan:  1.  The patient has been admitted to our behavioral health unit under voluntary status for reports of depressed mood and suicidal thoughts in the context of severe alcohol usage.  Treatment will be continued voluntarily and his care will be resumed by Dr. Munoz tomorrow.     2.  Continue MSSA protocol with Valium for management of alcohol withdrawal symptoms.  The patient is agreeable to start Abilify targeting mood stabilization and reduction of paranoia associated with his schizoaffective disorder.  Medication will be introduced at 5 mg daily and titrated up as indicated to address targeted symptoms.  Risks and benefits were reviewed and the patient consented to treatment as outlined in this document.     3. Psychosocial treatments to be addressed with social work consult and groups.  We will assist the patient and acquiring a chemical health assessment to explore available treatment options.  He is interested in  pursuing residential treatment if available.     4.  Anticipate a hospital stay of approximately 1 week as we target improvement in mood and remission of suicidal thoughts while formulating a plan of care to effectively transition his care out of the hospital.    ts      A problematic pattern of alcohol/drug use leading to clinically significant impairment or distress, as manifested by at least two of the following, occurring within a 12-month period:    1.) Alcohol/drug is often taken in larger amounts or over a longer period than was intended.  2.) There is a persistent desire or unsuccessful efforts to cut down or control alcohol/drug use  3.) A great deal of time is spent in activities necessary to obtain alcohol, use alcohol, or recover from its effects.  4.) Craving, or a strong desire or urge to use alcohol/drug  6.) Continued alcohol use despite having persistent or recurrent social or interpersonal problems caused or exacerbated by the effects of alcohol/drug.  7.) Important social, occupational, or recreational activities are given up or reduced because of alcohol/drug use.  9.) Alcohol/drug use is continued despite knowledge of having a persistent or recurrent physical or psychological problem that is likely to have been caused or exacerbated by alcohol.  10.) Tolerance, as defined by either of the following: A need for markedly increased amounts of alcohol/drug to achieve intoxication or desired effect. and A markedly diminished effect with continued use of the same amount of alcohol/drug.  11.) Withdrawal, as manifested by either of the following: The characteristic withdrawal syndrome for alcohol/drug (refer to Criteria A and B of the criteria set for alcohol/drug withdrawal). and Alcohol/drug (or a closely related substance, such as a benzodiazepine) is taken to relieve or avoid withdrawal symptoms.      Specify if: In early remission:  After full criteria for alcohol/drug use disorder were previously  met, none of the criteria for alcohol/drug use disorder have been met for at least 3 months but for less than 12 months (with the exception that Criterion A4,  Craving or a strong desire or urge to use alcohol/drug  may be met).     In sustained remission:   After full criteria for alcohol use disorder were previously met, none of the criteria for alcohol/drug use disorder have been met at any time during a period of 12 months or longer (with the exception that Criterion A4,  Craving or strong desire or urge to use alcohol/drug  may be met).   Specify if:   This additional specifier is used if the individual is in an environment where access to alcohol is restricted.    Mild: Presence of 2-3 symptoms  Moderate: Presence of 4-5 symptoms  Severe: Presence of 6 or more symptoms

## 2019-08-08 NOTE — PROGRESS NOTES
"[facility], [place]   Psychiatric Progress Note        Interim History:     The patient's care was discussed with the treatment team during the daily team meeting and/or staff's chart notes were reviewed.  Staff report patient is less irritable and more visible. Needed 5 mg of Valium one time since midnight. During today interview he had less prominent tremors of upper extremities and tongue. Appeared to be more upbeat and future focused, although, admitted that he almost left AMA yesterday: \"I wanted to drink\". He admitted that he was happy that he didn't leave, though. Said that he still wanted to go to CD treatment, was happy to hear that he will have CD assessment as early as today. Blood pressure is normalizing. Denied Suicidal ideation.         Medications:       folic acid  1 mg Oral Daily     gabapentin  300 mg Oral TID     multivitamin w/minerals  1 tablet Oral Daily     vitamin B1  100 mg Oral Daily          Allergies:   No Known Allergies       Labs:     No results found for this or any previous visit (from the past 24 hour(s)).       Psychiatric Examination:     /78   Pulse 57   Temp 97.9  F (36.6  C) (Oral)   Resp 16   Ht 1.905 m (6' 3\")   Wt 106.1 kg (233 lb 12.8 oz)   SpO2 97%   BMI 29.22 kg/m    Weight is 233 lbs 12.8 oz  Body mass index is 29.22 kg/m .    Orthostatic Vitals       Most Recent      Sitting Orthostatic /74 08/08 0905    Sitting Orthostatic Pulse (bpm) 59 08/08 0905    Standing Orthostatic BP --  Comment: pt refused  08/08 0905         Appearance: dressed in hospital scrubs and poorly groomed  Attitude:  cooperative  Eye Contact:  fair  Mood: less angry and depressed  Affect:  constricted mobility  Speech:  slurred, due to poor dentiition  Psychomotor Behavior:  Less tremor,   Throught Process:  logical, linear and goal oriented  Associations:  no loose associations  Thought Content:  Denies Auditory hallucinations, Visual hallucinations, " Suicidal ideation, Homicidal thoughts   Insight:  partial  Judgement:  fair  Oriented to:  time, person, and place  Attention Span and Concentration:  fair  Recent and Remote Memory:  fair    Clinical Global Impressions  First:  Considering your total clinical experience with this particular patient population, how severe are the patient's symptoms at this time?: 7 (08/06/19 2120)  Compared to the patient's condition at the START of treatment, this patient's condition is:: 4 (08/06/19 2120)  Most recent:  Considering your total clinical experience with this particular patient population, how severe are the patient's symptoms at this time?:   Compared to the patient's condition at the START of treatment, this patient's condition is:          Precautions:     Behavioral Orders   Procedures     Code 1 - Restrict to Unit     Routine Programming     As clinically indicated     Seizure precautions     Status 15     Every 15 minutes.     Withdrawal precautions          DIagnoses:     Schizoaffective disorder-bipolar type  Alcohol use disorder, severe with withdrawal.       Plan:     Will continue on MSSA protocol. No medication changes today. Will be seen by CD  today. After mental health stabilization he will go to CD treatment.

## 2019-08-08 NOTE — PROGRESS NOTES
"Patient slightly visible this shift. Patient spent most of his time in room napping and was only seen in the milieu for medications and meals. Reports feeling anxious and a bit depressed. Described mood as \"content\" and that he is doing \"so so\" Denied SI/SIB and hallucinations.        08/08/19 1342   Behavioral Health   Hallucinations denies / not responding to hallucinations   Thinking distractable;poor concentration   Orientation person: oriented;place: oriented;date: oriented;time: oriented   Memory baseline memory   Insight poor   Judgement impaired   Eye Contact at examiner   Affect blunted, flat   Mood anxious   Physical Appearance/Attire appears stated age   Hygiene neglected grooming - unclean body, hair, teeth   Suicidality other (see comments)  (denied )   1. Wish to be Dead No   2. Non-Specific Active Suicidal Thoughts  No   Self Injury   (denied )   Elopement   (none observed/mentioned )   Activity withdrawn;restless   Speech clear;coherent   Medication Sensitivity no stated side effects   Psychomotor / Gait balanced;steady   Psycho Education   Type of Intervention 1:1 intervention   Response participates, initiates socially appropriate   Hours 0.5   Treatment Detail   (check-in)   Safety   Suicidality Status 15   Activities of Daily Living   Hygiene/Grooming independent   Oral Hygiene independent   Dress independent   Room Organization independent   Groups   Details   (withdrawn )     "

## 2019-08-08 NOTE — CONSULTS
8/8/2019    Writer met with pt for CD eval. Pt is open to residential MI/CD treatment, signed releases for multiple treatment centers. Pt will be referred to:    [facility] MI/CD [facility] facility:   - [phone]  Fax - [phone]    [facility] Program:   - [phone]  Fax - [phone]    Yamilet You, [facility]  [phone]

## 2019-08-08 NOTE — PLAN OF CARE
Problem: OT General Care Plan  Goal: OT Frequency  Description  Pt will complete self-assessment within 3 days of initial attendance to OT group.     Attended 2/2 occupational therapy groups offered this date. Overall content and cooperative.  Mental health management group focusing on coping skill exploration. Education provided on maladaptive versus adaptive response to stress /symptoms and use within routine. Pt Response: During initial discussion, reported poor repertoire of healthy coping skills and interest in acquiring new ones and reinstate previously used ones such as playing the guitar and watching sports.    Collaborative multi-step hands-on meal preparation group. 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 Pt Response: Demonstrated fair process, performance, and collaborative social interaction skills. 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     Outcome: Improving

## 2019-08-09 PROCEDURE — 25000132 ZZH RX MED GY IP 250 OP 250 PS 637: Performed by: PSYCHIATRY & NEUROLOGY

## 2019-08-09 PROCEDURE — 12400001 ZZH R&B MH UMMC

## 2019-08-09 PROCEDURE — 25000132 ZZH RX MED GY IP 250 OP 250 PS 637: Performed by: EMERGENCY MEDICINE

## 2019-08-09 PROCEDURE — 99232 SBSQ HOSP IP/OBS MODERATE 35: CPT | Performed by: PSYCHIATRY & NEUROLOGY

## 2019-08-09 PROCEDURE — G0177 OPPS/PHP; TRAIN & EDUC SERV: HCPCS

## 2019-08-09 RX ADMIN — MULTIPLE VITAMINS W/ MINERALS TAB 1 TABLET: TAB at 09:06

## 2019-08-09 RX ADMIN — HYDROXYZINE HYDROCHLORIDE 25 MG: 25 TABLET, FILM COATED ORAL at 13:09

## 2019-08-09 RX ADMIN — THIAMINE HCL TAB 100 MG 100 MG: 100 TAB at 09:06

## 2019-08-09 RX ADMIN — GABAPENTIN 300 MG: 300 CAPSULE ORAL at 21:51

## 2019-08-09 RX ADMIN — FOLIC ACID 1 MG: 1 TABLET ORAL at 09:06

## 2019-08-09 RX ADMIN — GABAPENTIN 300 MG: 300 CAPSULE ORAL at 09:06

## 2019-08-09 RX ADMIN — GABAPENTIN 300 MG: 300 CAPSULE ORAL at 14:10

## 2019-08-09 ASSESSMENT — ACTIVITIES OF DAILY LIVING (ADL)
HYGIENE/GROOMING: INDEPENDENT
DRESS: INDEPENDENT
LAUNDRY: WITH SUPERVISION
ORAL_HYGIENE: INDEPENDENT

## 2019-08-09 NOTE — PROGRESS NOTES
08/09/19 1400   Occupational Therapy   Type of Intervention structured groups   Response Initiates, socially acceptable   Hours 2       Coping skill exploration group through guided Progressive Muscle Relaxation for decreased anxiety and stress management. Education was provided on the use of PMR and within daily/weekly routine for symptom management. Pt Response: Was observed fully engaged throughout this practice. Was observed with calm even demeanor and expressed some distractibility however positive outcome after group.     Occupational therapy clinic. Pt Response: Initiated care for room plants - watering and pruning. Observed eager to help writer with this taste as he reported experience and pleasure in this type of activity at home. Acquired detailed coloring pages for himself and others to use over the weekend.

## 2019-08-09 NOTE — PLAN OF CARE
Pt was visible in the milieu. At beginning of shift pt was withdrawn pacing in the halls. Pt was anxious and reported other withdrawal symptoms and prn medication was administered and was effective. After dinner pt was social with others and played cards for a long time with peer. Denied SI/SIB/AH/VH. Pt attended and participated community meeting as well as therapy group later in the evening. Presents with flat affect but brightens upon approach.

## 2019-08-09 NOTE — PROGRESS NOTES
The patient had a CD assessment completed yesterday. Writer spoke with the  who stated that she is referring the patient to [facility], who has a new facility in [place]. The patient will also be referred to [facility]. Writer will plan to follow-up with these programs Monday.

## 2019-08-09 NOTE — PROGRESS NOTES
"The pt is much more visible this shift. He is attending most of the groups offered. He reports heightened anxiety caused by \"wanting to go.\" He is hopeful that he will find an MICD, Because \"I've been going to rehab since I was 12, and I just want something to work.\" He denies SI/SIB urges and hallucinations. He is pleasant and appropriate in the milieu. He does state that he wishes to have his diet changed back to a normal one, due to food being \"inedible\".     08/09/19 9745   Behavioral Health   Hallucinations denies / not responding to hallucinations   Thinking distractable   Orientation person: oriented;place: oriented;date: oriented;time: oriented   Memory baseline memory   Insight admits / accepts   Judgement impaired   Eye Contact at examiner   Affect blunted, flat;other (see comments)  (*bright at times)   Mood labile;anxious;mood is calm   Physical Appearance/Attire appears stated age   Hygiene well groomed;other (see comment)  (pt showered)   Suicidality other (see comments)  (pt denies)   1. Wish to be Dead No   2. Non-Specific Active Suicidal Thoughts  No   Self Injury other (see comment)  (pt denies)   Elopement   (pt states he is ready to leave)   Activity other (see comment)  (much more visible, attending groups)   Speech clear;coherent   Medication Sensitivity no stated side effects;no observed side effects   Psychomotor / Gait balanced;steady     "

## 2019-08-09 NOTE — PROGRESS NOTES
" Note  The patient participated in an hour long case management led group. The focus of the group was on Being Vulnerable and Experiencing Shame to aid in mental health recovery. At the beginning of the group, the patient reported that he was feeling \"Anxious.\" The patient had a full range affect throughout the group and was appropriate with both writer and peers in the group.     "

## 2019-08-09 NOTE — PROGRESS NOTES
08/08/19 2200   Therapeutic Recreation   Type of Intervention structured groups   Activity game   Response Participates, initiates socially appropriate   Hours 0.5     Pt participated in Therapeutic Recreation group with focus on leisure participation, social engagement, and strategic cognitive reasoning.  Engaged and focused in the group recreational intervention via a group game.  Pt was a full participant for approximately half of the group. Pt entered room and asked about the group and the activity, then stated it sounded fun and encouraged others to come to group. Showed progress in session goals. Pt mood was calm. Appropriately shared sense of humor with peers during groups, and appeared to brighten with social interaction.

## 2019-08-09 NOTE — PROGRESS NOTES
Writer spoke with the patient to check-in. The patient did report that he was feeling anxious after having his CD assessment completed yesterday. The patient stated that he is anxious about waiting to have an response from the treatment facilities. Writer stated that she was able to speak with the CD  yesterday who stated that she was going to send the patient's to [facility] in [place], which is a new facility. The patient stated that he has never been to a dual diagnosis treatment facility and feels it will be good for him. Writer stated that she will be in contact with the treatment facility on Monday to follow-up on the referral. The patient was in agreeance with this plan.

## 2019-08-09 NOTE — PROGRESS NOTES
"[facility], [place]   Psychiatric Progress Note        Interim History:     The patient's care was discussed with the treatment team during the daily team meeting and/or staff's chart notes were reviewed.  Staff report patient is less irritable and more visible in milieu. Still has upper extremities shakes, but doesn't score high enough to need Valium. Last time was given Valium yesterday at 4 pm. He is in a better mood, hopes to go to CD treatment as soon as possible: \"I am restless here\".  Denied Suicidal ideation.         Medications:       folic acid  1 mg Oral Daily     gabapentin  300 mg Oral TID     multivitamin w/minerals  1 tablet Oral Daily     vitamin B1  100 mg Oral Daily          Allergies:   No Known Allergies       Labs:     No results found for this or any previous visit (from the past 24 hour(s)).       Psychiatric Examination:     /85 (BP Location: Right arm)   Pulse 74   Temp 97  F (36.1  C) (Tympanic)   Resp 16   Ht 1.905 m (6' 3\")   Wt 106.1 kg (233 lb 12.8 oz)   SpO2 94%   BMI 29.22 kg/m    Weight is 233 lbs 12.8 oz  Body mass index is 29.22 kg/m .    Orthostatic Vitals       Most Recent      Sitting Orthostatic /74 08/08 0905    Sitting Orthostatic Pulse (bpm) 59 08/08 0905    Standing Orthostatic /84 08/09 0832    Standing Orthostatic Pulse (bpm) 69 08/09 0832         Appearance: dressed in hospital scrubs and poorly groomed  Attitude:  cooperative  Eye Contact:  fair  Mood: less angry and depressed,  Affect:  constricted mobility  Speech:  slurred, due to poor dentiition  Psychomotor Behavior:  Less tremor,   Throught Process:  logical, linear and goal oriented  Associations:  no loose associations  Thought Content:  Denies Auditory hallucinations, Visual hallucinations, Suicidal ideation, Homicidal thoughts   Insight:  partial  Judgement:  fair  Oriented to:  time, person, and place  Attention Span and Concentration:  fair  Recent and " Remote Memory:  fair    Clinical Global Impressions  First:  Considering your total clinical experience with this particular patient population, how severe are the patient's symptoms at this time?: 7 (08/06/19 2120)  Compared to the patient's condition at the START of treatment, this patient's condition is:: 4 (08/06/19 2120)  Most recent:  Considering your total clinical experience with this particular patient population, how severe are the patient's symptoms at this time?:   Compared to the patient's condition at the START of treatment, this patient's condition is:          Precautions:     Behavioral Orders   Procedures     Code 1 - Restrict to Unit     Routine Programming     As clinically indicated     Seizure precautions     Status 15     Every 15 minutes.     Withdrawal precautions          DIagnoses:     Schizoaffective disorder-bipolar type  Alcohol use disorder, severe with withdrawal.       Plan:     Will continue on MSSA protocol for one more day. No medication changes today. Seen by CD  and papers sent to [facility] and CD program in [place], [place]. Will continue to provide support and structure and encourage to stay at this hospital because of very high risk of relapse.

## 2019-08-10 PROCEDURE — 25000132 ZZH RX MED GY IP 250 OP 250 PS 637: Performed by: EMERGENCY MEDICINE

## 2019-08-10 PROCEDURE — 25000132 ZZH RX MED GY IP 250 OP 250 PS 637: Performed by: PSYCHIATRY & NEUROLOGY

## 2019-08-10 PROCEDURE — H2032 ACTIVITY THERAPY, PER 15 MIN: HCPCS

## 2019-08-10 PROCEDURE — 12400001 ZZH R&B MH UMMC

## 2019-08-10 RX ADMIN — MULTIPLE VITAMINS W/ MINERALS TAB 1 TABLET: TAB at 07:46

## 2019-08-10 RX ADMIN — GABAPENTIN 300 MG: 300 CAPSULE ORAL at 21:53

## 2019-08-10 RX ADMIN — THIAMINE HCL TAB 100 MG 100 MG: 100 TAB at 07:46

## 2019-08-10 RX ADMIN — GABAPENTIN 300 MG: 300 CAPSULE ORAL at 07:46

## 2019-08-10 RX ADMIN — GABAPENTIN 300 MG: 300 CAPSULE ORAL at 14:58

## 2019-08-10 RX ADMIN — FOLIC ACID 1 MG: 1 TABLET ORAL at 07:46

## 2019-08-10 RX ADMIN — CLONIDINE HYDROCHLORIDE 0.1 MG: 0.1 TABLET ORAL at 14:58

## 2019-08-10 RX ADMIN — HYDROXYZINE HYDROCHLORIDE 25 MG: 25 TABLET, FILM COATED ORAL at 07:46

## 2019-08-10 ASSESSMENT — ACTIVITIES OF DAILY LIVING (ADL)
ORAL_HYGIENE: INDEPENDENT
DRESS: INDEPENDENT
HYGIENE/GROOMING: INDEPENDENT

## 2019-08-10 NOTE — PROGRESS NOTES
Pt s mood was calm and had neutral affect. He participated in group and in art project. He denied having suicidal ideation or self-harm thoughts. He denied having auditory or visual hallucination. His MSSA was 2.

## 2019-08-10 NOTE — PLAN OF CARE
"48 hour assessment:  alert and orientated x 4.  Denies any withdrawal symptoms. Denies suicidal thought, thoughts of self harm and hallucinations. Reports mood as being \"down\" this afternoon after visiting hours. States \"I really don't have any one\".  Reports that he plans to be discharged into a treatment center.  States \"I just don't want to be in [place]. I need to distance myself from there\"    "

## 2019-08-10 NOTE — PROGRESS NOTES
Patient's MSSA score was a 1. Otherwise, no other complaints or concerns voiced by patient or noted by staff. Will continue to monitor and update if there are changes.

## 2019-08-11 PROCEDURE — 12400001 ZZH R&B MH UMMC

## 2019-08-11 PROCEDURE — 25000132 ZZH RX MED GY IP 250 OP 250 PS 637: Performed by: PSYCHIATRY & NEUROLOGY

## 2019-08-11 PROCEDURE — 25000132 ZZH RX MED GY IP 250 OP 250 PS 637: Performed by: EMERGENCY MEDICINE

## 2019-08-11 RX ADMIN — MULTIPLE VITAMINS W/ MINERALS TAB 1 TABLET: TAB at 08:44

## 2019-08-11 RX ADMIN — THIAMINE HCL TAB 100 MG 100 MG: 100 TAB at 08:44

## 2019-08-11 RX ADMIN — GABAPENTIN 300 MG: 300 CAPSULE ORAL at 08:44

## 2019-08-11 RX ADMIN — GABAPENTIN 300 MG: 300 CAPSULE ORAL at 13:35

## 2019-08-11 RX ADMIN — GABAPENTIN 300 MG: 300 CAPSULE ORAL at 22:04

## 2019-08-11 RX ADMIN — FOLIC ACID 1 MG: 1 TABLET ORAL at 08:44

## 2019-08-11 RX ADMIN — HYDROXYZINE HYDROCHLORIDE 25 MG: 25 TABLET, FILM COATED ORAL at 13:35

## 2019-08-11 ASSESSMENT — ACTIVITIES OF DAILY LIVING (ADL)
HYGIENE/GROOMING: INDEPENDENT
ORAL_HYGIENE: INDEPENDENT
DRESS: INDEPENDENT
HYGIENE/GROOMING: INDEPENDENT
ORAL_HYGIENE: INDEPENDENT
HYGIENE/GROOMING: INDEPENDENT
DRESS: INDEPENDENT
ORAL_HYGIENE: INDEPENDENT
DRESS: INDEPENDENT

## 2019-08-11 ASSESSMENT — MIFFLIN-ST. JEOR: SCORE: 1571.59

## 2019-08-11 NOTE — PROGRESS NOTES
08/11/19 9758   Behavioral Health   Hallucinations denies / not responding to hallucinations   Thinking poor concentration   Orientation person: oriented;place: oriented;date: oriented   Memory baseline memory   Insight admits / accepts   Judgement impaired   Eye Contact at examiner   Affect tense   Mood anxious   Physical Appearance/Attire attire appropriate to age and situation   Hygiene well groomed   Suicidality other (see comments)  (denies)   1. Wish to be Dead No   2. Non-Specific Active Suicidal Thoughts  No   Self Injury other (see comment)  (denies)   Elopement   (nothing to report)   Activity restless;other (see comment)  (pacing)   Speech clear;coherent   Medication Sensitivity no observed side effects   Psychomotor / Gait balanced;steady   Psycho Education   Type of Intervention 1:1 intervention   Response participates, initiates socially appropriate   Hours 0.5   Treatment Detail   (chekc in)   Activities of Daily Living   Hygiene/Grooming independent   Oral Hygiene independent   Dress independent   Room Organization independent   The patient was out of his room and social with peers this shift.  The patient was pacing often throughout the day and reports the best way to describe his mood would be anxious.  The patient reports this week period is the longest he had been sober in a long time and is hoping to be able to go to MI/CD treatment upon leaving the hospital.  The patient was appropriate with staff and pleasant to work with.  The patient was engaged in trying to use coping skills, but also good about make sure to take his medications when needed.  The patient denies SI and SiB.

## 2019-08-11 NOTE — PROGRESS NOTES
Patient's MSSA score was a 1. No other complaints or concerns voiced by patient or noted by staff. Will continue to monitor and update if there are changes.

## 2019-08-11 NOTE — PROGRESS NOTES
08/10/19 1300   Art Therapy   Type of Intervention structured groups   Response Participated with encouragement   Hours 1   Treatment Detail Art Therapy-  rubber stamp mandalas   Problem- Schizoaffective disorder-bipolar type  Alcohol use disorder, severe          Goal- cope, express, regulate through Art Therapy directives     Outcome- pt was pleasant and cooperative. He did an organic pattern and he remembered how he signed his name as a child when a teacher asked them to come up with a unique signature. He did this in the four corners.He formed the letters LUIS into a little person figure. He seemed pleased to share that. Last group with writer he walked out and he apologized for that evening. The group was an affirmation group and he was feeling too down to participate. Today, he was engaged. He said he was bored and wanted something to do. He said he had been enjoying a lot of coloring.

## 2019-08-11 NOTE — PROGRESS NOTES
Pt s mood was calm and had full range of affect. He participated in therapy group and in art project. He denied having suicidal ideation or self-harm thoughts. He denied having auditory or visual hallucination. His MSSA was 1. He denied having any withdrawal symptoms

## 2019-08-12 PROCEDURE — 25000132 ZZH RX MED GY IP 250 OP 250 PS 637: Performed by: EMERGENCY MEDICINE

## 2019-08-12 PROCEDURE — 25000132 ZZH RX MED GY IP 250 OP 250 PS 637: Performed by: PSYCHIATRY & NEUROLOGY

## 2019-08-12 PROCEDURE — G0177 OPPS/PHP; TRAIN & EDUC SERV: HCPCS

## 2019-08-12 PROCEDURE — 99232 SBSQ HOSP IP/OBS MODERATE 35: CPT | Performed by: PSYCHIATRY & NEUROLOGY

## 2019-08-12 PROCEDURE — 12400001 ZZH R&B MH UMMC

## 2019-08-12 RX ADMIN — GABAPENTIN 300 MG: 300 CAPSULE ORAL at 14:07

## 2019-08-12 RX ADMIN — FOLIC ACID 1 MG: 1 TABLET ORAL at 08:06

## 2019-08-12 RX ADMIN — MULTIPLE VITAMINS W/ MINERALS TAB 1 TABLET: TAB at 08:05

## 2019-08-12 RX ADMIN — GABAPENTIN 300 MG: 300 CAPSULE ORAL at 21:10

## 2019-08-12 RX ADMIN — GABAPENTIN 300 MG: 300 CAPSULE ORAL at 08:05

## 2019-08-12 RX ADMIN — THIAMINE HCL TAB 100 MG 100 MG: 100 TAB at 08:06

## 2019-08-12 ASSESSMENT — ACTIVITIES OF DAILY LIVING (ADL)
LAUNDRY: WITH SUPERVISION
DRESS: SCRUBS (BEHAVIORAL HEALTH)
HYGIENE/GROOMING: INDEPENDENT
LAUNDRY: UNABLE TO COMPLETE
HYGIENE/GROOMING: INDEPENDENT
DRESS: SCRUBS (BEHAVIORAL HEALTH)
ORAL_HYGIENE: INDEPENDENT
ORAL_HYGIENE: INDEPENDENT

## 2019-08-12 NOTE — PROGRESS NOTES
provided the patient with an update regarding his referral.  informed him that she was able to speak with the CD , who stated that she sent the patient's referral off today. nAnr stated that she is planning to follow-up with the facility tomorrow. The patient was in agreeance with this plan. He stated that he just experiences nervousness around the uncertainty.  stated that the moment she heard from the facility, she would let the patient know.      called Khloe to check on the status of the patient's referral.  spoke with admissions staff who stated that they are still reviewing the patient's referral, but that writer should hear back from someone tomorrow afternoon.

## 2019-08-12 NOTE — PROGRESS NOTES
"[facility], [place]   Psychiatric Progress Note        Interim History:     The patient's care was discussed with the treatment team during the daily team meeting and/or staff's chart notes were reviewed.  Staff report patient is less irritable and more visible in milieu. No signs of alcohol withdrawal anymore. Denied Suicidal ideation, contracted for safety here, but reported: \"voices in my head\". When asked to clarify what voices he meant, he replied that he could hear voices of his family talking negatively about him, putting him down. Admitted that at times he responds to voices and talks to them. Denied that voices ever told him really bizarre things or told him to harm himself. Merrill was pretty open with me today, told me about his many negative experiences that made him feel like a second degree person and outcast. Said that he felt that he had not met too many good people and frequently felt betrayed in his life. We discussed his meds, symptoms, Auditory hallucinations. I advised him that negative and mood congruent Auditory hallucinations were common with Major depressive disorder and didn't, necessarily, mean a genuine psychosis. I also advised him to focus now on short term goals such as maintaining sobriety and dealing with his relationships with people for the future. He was receptive to my advices.    Per CTC: \"Writer provided the patient with an update regarding his referral. Writer informed him that she was able to speak with the CD , who stated that she sent the patient's referral off today. Writer stated that she is planning to follow-up with the facility tomorrow. The patient was in agreeance with this plan. He stated that he just experiences nervousness around the uncertainty. Writer stated that the moment she heard from the facility, she would let the patient know.      Writer called [facility] to check on the status of the patient's referral. Writer spoke " "with admissions staff who stated that they are still reviewing the patient's referral, but that writer should hear back from someone tomorrow afternoon.\"         Medications:       folic acid  1 mg Oral Daily     gabapentin  300 mg Oral TID     multivitamin w/minerals  1 tablet Oral Daily     vitamin B1  100 mg Oral Daily          Allergies:   No Known Allergies       Labs:     No results found for this or any previous visit (from the past 24 hour(s)).       Psychiatric Examination:     BP (!) 154/70 (BP Location: Left arm)   Pulse 64   Temp 98  F (36.7  C) (Oral)   Resp 18   Ht 1.905 m (6' 3\")   Wt 62.6 kg (138 lb)   SpO2 97%   BMI 17.25 kg/m    Weight is 138 lbs 0 oz  Body mass index is 17.25 kg/m .    Orthostatic Vitals       Most Recent      Sitting Orthostatic /87 08/12 0740    Sitting Orthostatic Pulse (bpm) 50 08/12 0740    Standing Orthostatic /82 08/12 0740    Standing Orthostatic Pulse (bpm) 60 08/12 0740         Appearance: dressed in hospital scrubs and poorly groomed  Attitude:  cooperative  Eye Contact:  fair  Mood: less depressed,  Affect:  constricted mobility  Speech:  slurred, due to poor dentiition  Psychomotor Behavior:  No psychomotor agitation or retardation,   Throught Process:  logical, linear and goal oriented  Associations:  no loose associations  Thought Content:  Denies Visual hallucinations, Suicidal ideation, Homicidal thoughts. Reports above mentioned Auditory hallucinations, See discussion above.    Insight:  partial  Judgement:  fair  Oriented to:  time, person, and place  Attention Span and Concentration:  fair  Recent and Remote Memory:  fair    Clinical Global Impressions  First:  Considering your total clinical experience with this particular patient population, how severe are the patient's symptoms at this time?: 7 (08/06/19 2120)  Compared to the patient's condition at the START of treatment, this patient's condition is:: 4 (08/06/19 2120)  Most " recent:  Considering your total clinical experience with this particular patient population, how severe are the patient's symptoms at this time?:   Compared to the patient's condition at the START of treatment, this patient's condition is:          Precautions:     Behavioral Orders   Procedures     Code 1 - Restrict to Unit     Routine Programming     As clinically indicated     Seizure precautions     Status 15     Every 15 minutes.     Withdrawal precautions          DIagnoses:     Schizoaffective disorder-bipolar type  Alcohol use disorder, severe with withdrawal.       Plan:     Will discontinue MSSA protocol today. Will ask for IM consult due to elevated blood pressure. Will start on Prozac. Seen by CD  and referrals were made, see CTC's note above. Will continue to provide support and structure and encourage to stay at this hospital because of very high risk of relapse.

## 2019-08-12 NOTE — PROGRESS NOTES
Pt attended groups this shift and he is waiting for funding CD treatment. He appears cooperative and pleasant on approach. He denies suicidal ideations or hearing voices. He has been social with other and watching TV in the lounge. He states that he sleeps and eats well.     08/12/19 1306   Behavioral Health   Hallucinations denies / not responding to hallucinations   Thinking intact   Orientation time: oriented;date: oriented;person: oriented;place: oriented   Memory baseline memory   Insight admits / accepts   Judgement impaired   Eye Contact at examiner   Affect full range affect   Mood depressed;mood is calm   Physical Appearance/Attire appears stated age   Hygiene well groomed   Suicidality other (see comments)  (Denies)   1. Wish to be Dead No   2. Non-Specific Active Suicidal Thoughts  No   Self Injury other (see comment)  (Denies)   Speech coherent;clear   Medication Sensitivity no observed side effects   Psychomotor / Gait balanced;steady   Psycho Education   Type of Intervention 1:1 intervention   Response participates, initiates socially appropriate   Hours 0.5   Activities of Daily Living   Hygiene/Grooming independent   Oral Hygiene independent   Dress scrubs (behavioral health)   Laundry with supervision   Room Organization independent   Activity   Activity Assistance Provided independent

## 2019-08-12 NOTE — PROGRESS NOTES
Pt s mood was calm and had full range of affect. He was social with others. He denied having suicidal ideation or self-harm thoughts. He denied having auditory or visual hallucination. His MSSA was 1. He denied having any withdrawal symptoms. His blood pressure was slightly elevated. B/P 163/80, P/64.

## 2019-08-12 NOTE — PROGRESS NOTES
CLINICAL NUTRITION SERVICES - BRIEF NOTE    Reason for Evaluation: Provider Order - pt/family request - Wants to be put back on regular diet. States he will order appropriate foods that he know he can eat.    Interventions  Inter-professional collaboration: talked with RN about options for pt and that the Regular diet is pt preference    Follow Up/ Monitoring  RD will continue to follow per plan of care.        Tessie Bay RD, LD  Pager: [phone]

## 2019-08-13 PROCEDURE — 12400001 ZZH R&B MH UMMC

## 2019-08-13 PROCEDURE — 25000132 ZZH RX MED GY IP 250 OP 250 PS 637: Performed by: EMERGENCY MEDICINE

## 2019-08-13 PROCEDURE — 99207 ZZC CONSULT E&M CHANGED TO INITIAL LEVEL: CPT | Performed by: PHYSICIAN ASSISTANT

## 2019-08-13 PROCEDURE — G0177 OPPS/PHP; TRAIN & EDUC SERV: HCPCS

## 2019-08-13 PROCEDURE — 25000132 ZZH RX MED GY IP 250 OP 250 PS 637: Performed by: PSYCHIATRY & NEUROLOGY

## 2019-08-13 PROCEDURE — 25000132 ZZH RX MED GY IP 250 OP 250 PS 637: Performed by: PHYSICIAN ASSISTANT

## 2019-08-13 PROCEDURE — 99222 1ST HOSP IP/OBS MODERATE 55: CPT | Performed by: PHYSICIAN ASSISTANT

## 2019-08-13 RX ORDER — AMLODIPINE BESYLATE 5 MG/1
5 TABLET ORAL DAILY
Status: DISCONTINUED | OUTPATIENT
Start: 2019-08-13 | End: 2019-08-16

## 2019-08-13 RX ORDER — CLOBETASOL PROPIONATE 0.5 MG/G
CREAM TOPICAL 2 TIMES DAILY
Status: DISCONTINUED | OUTPATIENT
Start: 2019-08-13 | End: 2019-08-27 | Stop reason: HOSPADM

## 2019-08-13 RX ADMIN — FLUOXETINE 20 MG: 20 CAPSULE ORAL at 08:11

## 2019-08-13 RX ADMIN — GABAPENTIN 300 MG: 300 CAPSULE ORAL at 14:50

## 2019-08-13 RX ADMIN — CLOBETASOL PROPIONATE: 0.5 CREAM TOPICAL at 14:51

## 2019-08-13 RX ADMIN — FOLIC ACID 1 MG: 1 TABLET ORAL at 08:11

## 2019-08-13 RX ADMIN — GABAPENTIN 300 MG: 300 CAPSULE ORAL at 21:24

## 2019-08-13 RX ADMIN — AMLODIPINE BESYLATE 5 MG: 5 TABLET ORAL at 14:50

## 2019-08-13 RX ADMIN — THIAMINE HCL TAB 100 MG 100 MG: 100 TAB at 08:11

## 2019-08-13 RX ADMIN — GABAPENTIN 300 MG: 300 CAPSULE ORAL at 08:11

## 2019-08-13 RX ADMIN — MULTIPLE VITAMINS W/ MINERALS TAB 1 TABLET: TAB at 08:11

## 2019-08-13 ASSESSMENT — ACTIVITIES OF DAILY LIVING (ADL)
HYGIENE/GROOMING: INDEPENDENT
DRESS: INDEPENDENT
ORAL_HYGIENE: INDEPENDENT

## 2019-08-13 ASSESSMENT — MIFFLIN-ST. JEOR: SCORE: 2040.61

## 2019-08-13 NOTE — PROGRESS NOTES
"   08/12/19 6849   Behavioral Health   Hallucinations denies / not responding to hallucinations   Thinking intact   Orientation person: oriented;place: oriented;date: oriented;time: oriented   Memory baseline memory   Insight admits / accepts   Judgement impaired   Eye Contact at examiner   Affect full range affect   Mood mood is calm   Physical Appearance/Attire appears stated age   Hygiene well groomed   Suicidality other (see comments)  (denies)   1. Wish to be Dead No   2. Non-Specific Active Suicidal Thoughts  No   Self Injury other (see comment)  (denies)   Elopement   (none stated or observed)   Activity other (see comment)  (active in groups and in milieu )   Speech coherent;other (see comments)  (mumbling intermittently )   Medication Sensitivity no observed side effects   Psychomotor / Gait balanced;steady   Activities of Daily Living   Hygiene/Grooming independent   Oral Hygiene independent   Dress scrubs (behavioral health)   Laundry unable to complete   Room Organization independent     Pt was active in milieu and sociable with peers. Pt said that he was using humor to cope with his situation. He said that he was kind of glad that he was on the unit since his drinking was regulated by this. He said that if he was outside \"I'd be at a bar with a drink in my hand in 30 minutes.\" He sounded sad about that, and seemed as if he wanted to get his drinking under control. He said that his housing situation was under control and said that his rehab placement may offer some sort of housing. Pt talked a lot about his past experiences with alcohol. Writer tried to emphasize the mentions of the periods of sobriety, since they lasted longer than the relapses. This also showed that Pt was capable of being sober in the right environment.   "

## 2019-08-13 NOTE — PLAN OF CARE
48 hours reassessment:  Pt's mood was calm and had full range of affect. He was social with others. He denied having suicidal ideation or self-harm thoughts. He denied having auditory or visual hallucination. He denied having any withdrawal symptoms. His B/P was elevated. B/P 168/82, P/62. He had no complains. Pt declined clonidine prn.

## 2019-08-13 NOTE — CONSULTS
Internal Medicine Initial Visit      Merrill Corcoran MRN# [id number]   YOB: 1970 Age: 48 year old   Date of Admission: 8/5/2019  PCP: No Ref-Primary, Physician    Referring Provider: Behavioral Health - Jeremy Marshall MD  Reason for Visit: Hypertension         Assessment and Recommendations:   Merrill Corcoran is a 48 year old year old man with a history of schizoaffective disorder, alcohol abuse, bladder cancer, psoriasis, hypertension, and hyperlipidemia who is admitted to [facility] for alcohol abuse with withdrawal and SI. Medicine consulted for elevation in blood pressure.       Schizoaffective Disorder and Alcohol Abuse with Withdrawal and SI:  - Management per psychiatry team.     Hypertension: Pt has been on medication for HTN in the past. Was taken off all medications after significant lifestyle modifications including diet and exercise that improved blood pressure. Pt states that he has not been keeping up with these changes, including etoh abuse, which is likely why his blood pressure has been elevated as of late. Pressures are averaging in the 160s/80s since admission. Pt asymptomatic at this time.  - Start amlodipine 5 mg PO every day with parameters to hold  - Recommend diet and exercise  - Follow up with PCP after discharge from hospital    Hyperlipidemia: Pt has a history of hyperlipidemia which resolved with diet and exercise. Pt has not been keeping up with these lifestyle modifications, which is likely why his cholesterol is elevated currently. , HDL 68, total cholesterol 231, trigs 153 on 8/7/19.  - Recommend diet and exercise   - Follow up with PCP after discharge from hospital    Chronic Psoriasis: Pt currently experiencing pruritis on his upper extremities, not relieved by topical body cream. Areas of psoriasis noted on extensor surfaces of arms bilaterally.  - Start clobetasol cream BID     Chronic hepatitis C: Pt notes history of Hep C, as documented by positive  viral load back in 2015. Saw GI once at [facility] but no follow up since. Denies hx treatment. LFTs on 8/12/19 WNL.   - Referral to [facility] GI clinic at discharge     Hx of bladder Cancer, in remission: Pt diagnosed 8 years ago and underwent surgical resection of the tumor, stage 0a. No longer closely followed by urology, but does complete UA screenings. Pt denies hematuria or dysuria at this time.  - Management per PCP    Chronic Lung Nodule: Pt diagnosed in 2008 but does not recall this diagnosis. Currently asymptomatic.  - Management per PCP    Chronic Intermittent Left Arm Numbness: Pt notes mild left arm numbness that occasionally radiates to his chest upon waking up in the morning x 2 years. Pt has not seen a provider for this. Likely due to sleeping position.   - Management per PCP      Medicine will follow for a few days with daily chart check to review his BPs. Otherwise no further medicine recommendations at this time, please page with any additional concerns.     Castro Holguin PA-C  Internal Medicine Hospitalist   [facility]  [phone]      Reason for Admission:   Alcohol Abuse with Withdrawal         History of Present Illness:   History is obtained from the patient and medical record.     This patient is a 48 year old male with a history of schizoaffective disorder, alcohol abuse, bladder cancer, psoriasis, hypertension, and hyperlipidemia admitted to behavioral health for alcohol abuse with withdrawal and SI. Pt has been alcohol dependent for the past 13 months and has been drinking 1/5 of vodka per day. Pt states he has a history of tremors when going through withdrawal and believes he has had seizures before although they have not been observed. He notes waking up with blood on him and scratches without memory of these events which he attributes to seizure activity. Pt smokes 1/2ppd. Notes past history of IV methamphetamine use but has not used in the past 14 months. Patient  was adopted shortly after birth. Pt is single, lives alone, and does not have children.     Pt has a history of high blood pressure that resolved with lifestyle modifications. Not currently on medication. Blood pressures have been elevated to 160s/80s since admission. Pt denies headaches, dizziness, lightheadedness, or nausea. Pt admits he has not been eating healthy or exercising since he began drinking on a regular basis. Pt also notes a history of psoriasis and is currently having an acute flare. Pt notes itchiness on both elbows, not relieved by topical body cream. Pt also notes intermittent left arm numbness when he wakes up in the morning for the past 2 years. Associated with occasional radiating chest pain which he attributes to drug use. Pt has not seen a provider for this.      Internal Medicine service was asked to see patient for high blood pressure. Currently, patient is stable.          Review of Systems:   Constitutional: negative for fever/chills or recent weight changes   Eyes: negative for vision changes   Ears/Nose/Throat: negative for ear pain, sore throat, nasal or sinus congestion   Cardiovascular: negative for chest pain or palpitations   Respiratory: negative for cough or shortness of breath   Gastrointestinal: positive for constipation, negative for abdominal pain, N/V, or diarrhea   Genitourinary: negative for dysuria, urinary urgency or frequency   Musculoskeletal: negative for joint pain   Neurologic: positive for intermittent numbness in his left arm, negative for headaches, tingling, or weakness of extremities   Skin: positive for rash, negative for wounds   Endocrine: negative for polydipsia, polyphagia, polyuria; negative for heat/cold intolerance           Past Medical History:   Reviewed and updated in Epic.   Past Medical History:   Diagnosis Date     Schizoaffective disorder (H)              Past Surgical History:   Reviewed and updated in Epic.   Past Surgical History:   Procedure  Laterality Date     CHOLECYSTECTOMY               Social History:     Social History     Socioeconomic History     Marital status: Single     Spouse name: Not on file     Number of children: Not on file     Years of education: Not on file     Highest education level: Not on file   Occupational History     Not on file   Social Needs     Financial resource strain: Not on file     Food insecurity:     Worry: Not on file     Inability: Not on file     Transportation needs:     Medical: Not on file     Non-medical: Not on file   Tobacco Use     Smoking status: Current Every Day Smoker     Packs/day: 1.00     Smokeless tobacco: Never Used   Substance and Sexual Activity     Alcohol use: Yes     Frequency: Never     Comment: 1/5 abimbola/day     Drug use: Not Currently     Sexual activity: Not on file   Lifestyle     Physical activity:     Days per week: Not on file     Minutes per session: Not on file     Stress: Not on file   Relationships     Social connections:     Talks on phone: Not on file     Gets together: Not on file     Attends Yazdanism service: Not on file     Active member of club or organization: Not on file     Attends meetings of clubs or organizations: Not on file     Relationship status: Not on file     Intimate partner violence:     Fear of current or ex partner: Not on file     Emotionally abused: Not on file     Physically abused: Not on file     Forced sexual activity: Not on file   Other Topics Concern     Not on file   Social History Narrative     Not on file              Family History:   History reviewed. No pertinent family history.           Allergies:   No Known Allergies          Medications:     Medications Prior to Admission   Medication Sig Dispense Refill Last Dose     aspirin (ASA) 325 MG EC tablet Take 325 mg by mouth every 6 hours as needed for other (headache)   Past Month at Unknown time        Current Facility-Administered Medications   Medication     acetaminophen (TYLENOL) tablet  "650 mg     alum & mag hydroxide-simethicone (MYLANTA ES/MAALOX  ES) suspension 30 mL     bisacodyl (DULCOLAX) Suppository 10 mg     cloNIDine (CATAPRES) tablet 0.1 mg     FLUoxetine (PROzac) capsule 20 mg     folic acid (FOLVITE) tablet 1 mg     gabapentin (NEURONTIN) capsule 300 mg     hydrOXYzine (ATARAX) tablet 25 mg     magnesium hydroxide (MILK OF MAGNESIA) suspension 30 mL     multivitamin w/minerals (THERA-VIT-M) tablet 1 tablet     OLANZapine (zyPREXA) tablet 10 mg    Or     OLANZapine (zyPREXA) injection 10 mg     traZODone (DESYREL) tablet 50 mg     vitamin B1 (THIAMINE) tablet 100 mg            Physical Exam:   Blood pressure (!) 162/80, pulse 65, temperature 98.8  F (37.1  C), temperature source Oral, resp. rate 16, height 1.905 m (6' 3\"), weight 109.5 kg (241 lb 6.4 oz), SpO2 98 %.  Body mass index is 30.17 kg/m .  GENERAL: Alert and oriented x 3.   HEENT: Anicteric sclera. Mucous membranes moist.   CV: RRR. S1, S2. No murmurs appreciated.   RESPIRATORY: Effort normal on room air. Lungs CTAB with no wheezing, rales, rhonchi.   GI: Abdomen soft, non distended, non tender.   MUSCULOSKELETAL: No joint swelling or tenderness.  NEUROLOGICAL: No focal deficits. Moves all extremities.   EXTREMITIES: No peripheral edema. Intact bilateral pedal pulses.   SKIN: Non-erythematous, pruritic patches on extensor antecubitals bilaterally. No jaundice.            Data:   CBC:  Recent Labs   Lab Test 08/07/19  0732   WBC 5.3   RBC 4.40   HGB 14.8   HCT 44.4   *   MCH 33.6*   MCHC 33.3   RDW 13.2          CMP:  Recent Labs   Lab Test 08/07/19  0732      POTASSIUM 4.2   CHLORIDE 109   ZACH 8.1*   CO2 27   BUN 11   CR 0.77   *   AST 23   ALT 33   BILITOTAL 0.9   ALBUMIN 2.9*   PROTTOTAL 6.5*   ALKPHOS 92       TSH:  TSH   Date Value Ref Range Status   08/07/2019 2.08 0.40 - 4.00 mU/L Final       Tox screen: positive for benzodiazepines     Unresulted Labs Ordered in the Past 30 Days of this " Admission     No orders found from 7/6/2019 to 8/6/2019.

## 2019-08-13 NOTE — PROGRESS NOTES
"Pt reported, \"doing good, but I am starting to get stir crazy!\", reports some anxiety in regards to placement when discharged. Pt does not know when his discharge date is. Pt reports heavy substance use/alcohol. \"I've been drinking everyday straight for at least a year, probably longer. I go  a couple bottles of abimbola and go sit at [employer]\". Pt reports \"I am ready for a place to get sober but I know if I left today I'd go right back to drinking\". Pt reports no AH/VH at this time, but was experience some before he came here, \"I hear a voice in my head, but it's not mine, it's more like advice from my mom or sister\". Pt reports he feels safe, does not endorse any SI/SIB/HI. Pt just reports he feels cooped up and \"want to know when I'd be leaving so I at least have that to wrap my mind around\".      08/13/19 1406   Behavioral Health   Hallucinations denies / not responding to hallucinations   Thinking intact   Orientation person: oriented;place: oriented;date: oriented;time: oriented   Memory baseline memory   Insight admits / accepts   Judgement impaired   Eye Contact at examiner   Affect full range affect   Mood mood is calm;anxious   Physical Appearance/Attire appears stated age;disheveled   Hygiene other (see comment)  (encouraged to shower)   Suicidality other (see comments)  (denies)   1. Wish to be Dead No   2. Non-Specific Active Suicidal Thoughts  No   3. Active Sucidal Ideation with any Methods (Not Plan) Without Intent to Act  No   4. Active Suicidal Ideation with Some Intent to Act, Without Specific Plan  No   5. Active Suicidal Ideation with Specific Plan and Intent  No   Duration (Lifetime) NA   Coping/Psychosocial   Verbalized Emotional State acceptance;anxiety   Psycho Education   Type of Intervention 1:1 intervention   Response participates, initiates socially appropriate   Hours 0.5   Treatment Detail Check-in     "

## 2019-08-13 NOTE — PROGRESS NOTES
Writer spoke with the patient who stated that he was unable to get much sleep last night due to staff talking loudly outside of his room door. The patient also stated that he was able to speak with his mother who stated that he lost his housing due to not paying the rent on time. He stated that his brother was able to move his stuff of the apartment but he was relieved that he would not be getting eviction citation his record. The patient expressed interest in getting a adult mental health  for discharge. The patient is still anxious about getting into treatment but is hopeful. Writer stated that she spoke with the facility yesterday who said that they will have a decision either this afternoon or tomorrow morning.

## 2019-08-14 PROCEDURE — 12400001 ZZH R&B MH UMMC

## 2019-08-14 PROCEDURE — 25000132 ZZH RX MED GY IP 250 OP 250 PS 637: Performed by: PSYCHIATRY & NEUROLOGY

## 2019-08-14 PROCEDURE — 25000132 ZZH RX MED GY IP 250 OP 250 PS 637: Performed by: PHYSICIAN ASSISTANT

## 2019-08-14 PROCEDURE — 90853 GROUP PSYCHOTHERAPY: CPT

## 2019-08-14 PROCEDURE — G0177 OPPS/PHP; TRAIN & EDUC SERV: HCPCS

## 2019-08-14 PROCEDURE — 99232 SBSQ HOSP IP/OBS MODERATE 35: CPT | Performed by: PSYCHIATRY & NEUROLOGY

## 2019-08-14 PROCEDURE — 25000132 ZZH RX MED GY IP 250 OP 250 PS 637: Performed by: EMERGENCY MEDICINE

## 2019-08-14 RX ADMIN — THIAMINE HCL TAB 100 MG 100 MG: 100 TAB at 07:44

## 2019-08-14 RX ADMIN — GABAPENTIN 300 MG: 300 CAPSULE ORAL at 07:44

## 2019-08-14 RX ADMIN — CLOBETASOL PROPIONATE: 0.5 CREAM TOPICAL at 21:15

## 2019-08-14 RX ADMIN — GABAPENTIN 300 MG: 300 CAPSULE ORAL at 14:37

## 2019-08-14 RX ADMIN — GABAPENTIN 300 MG: 300 CAPSULE ORAL at 21:15

## 2019-08-14 RX ADMIN — AMLODIPINE BESYLATE 5 MG: 5 TABLET ORAL at 07:44

## 2019-08-14 RX ADMIN — CLOBETASOL PROPIONATE: 0.5 CREAM TOPICAL at 10:09

## 2019-08-14 RX ADMIN — FLUOXETINE 20 MG: 20 CAPSULE ORAL at 07:44

## 2019-08-14 RX ADMIN — MULTIPLE VITAMINS W/ MINERALS TAB 1 TABLET: TAB at 07:44

## 2019-08-14 RX ADMIN — FOLIC ACID 1 MG: 1 TABLET ORAL at 07:44

## 2019-08-14 ASSESSMENT — ACTIVITIES OF DAILY LIVING (ADL)
ORAL_HYGIENE: INDEPENDENT
DRESS: SCRUBS (BEHAVIORAL HEALTH);INDEPENDENT
LAUNDRY: WITH SUPERVISION
LAUNDRY: WITH SUPERVISION
ORAL_HYGIENE: INDEPENDENT
HYGIENE/GROOMING: INDEPENDENT
HYGIENE/GROOMING: SHOWER;INDEPENDENT
DRESS: STREET CLOTHES;SCRUBS (BEHAVIORAL HEALTH)

## 2019-08-14 NOTE — PROGRESS NOTES
08/14/19 1600   Occupational Therapy   Type of Intervention structured groups   Response Initiates, socially acceptable   Hours 2     Attended 2/2 occupational therapy groups offered this date. Overall bright affect and full engagement.  Reported feeling motivated yet restless/eager for discharge and sobriety; initiated and engaged in significant change talk r/t ETOH and smoking tobacco. Reported hopes to find a significant other and start a family someday.

## 2019-08-14 NOTE — PROGRESS NOTES
Participated in Music Therapy group with focus on mood elevation, validation and decreasing anxiety and improved group cohesiveness. Engaged and cooperative in music listening interventions.   Showed progress in session goals.  Socially engaged.

## 2019-08-14 NOTE — PROGRESS NOTES
Received a call back from [facility] and they said that pt's info was reviewed and they don't feel like like he would be a good fit in the program due to medications.  They recommended PCC in [place], or [facility].     Writer faxed CD assessment to [facility], and [facility].

## 2019-08-14 NOTE — PROGRESS NOTES
Called [facility] in [place] [phone] to check on referral.  Director stated that she will review it now and call back within the hour.      Called [facility] [phone] to check on referral.  Got a VM for .  VM stated that wait time for admission is approximately 3-4 weeks.  Left a VM requesting an update on referral.

## 2019-08-14 NOTE — PLAN OF CARE
Problem: Adult Behavioral Health Plan of Care  Goal: Team Discussion  Description  Team Plan:  Outcome: Improving  Note:   BEHAVIORAL TEAM DISCUSSION    Participants: Doctor Marshall; Debbie Lock RN; Martha OLMEDO; Sigrid Mcconnell OT  Progress: Denies SI/HI/AH/VH. Social and calm.  Attending groups.  Eager to go to treatment upon discharge.  Continued Stay Criteria/Rationale: Ongoing AH suspected.  Pt awaiting admission to CD treatment.   Medical/Physical: Medical team monitoring hypertension.  Precautions:   Behavioral Orders   Procedures    Code 1 - Restrict to Unit    Routine Programming     As clinically indicated    Seizure precautions    Status 15     Every 15 minutes.     Plan: Awaiting admission to CD treatment.  CD assessment completed and referrals made to [facility] in [place] and [place] through [facility].   Rationale for change in precautions or plan: Taken off withdrawal precautions due to denial of any withdrawal symptoms.

## 2019-08-14 NOTE — PROGRESS NOTES
"[facility], [place]   Psychiatric Progress Note        Interim History:     The patient's care was discussed with the treatment team during the daily team meeting and/or staff's chart notes were reviewed.  Staff report patient is more social with peers. He is more open talking about his life. During today visit told me that he communicated with is family and was happy to hear that they care about him. Said that his little brother helped him to move his possessions because patient's lease was over. Said that he is more hopeful for the future, looks forward to going to [facility] treatment. He had no further questions or concerns. He was declined by [facility], referred to different programs. See CTC's note below.     Per CTC: \" Received a call back from [facility] and they said that pt's info was reviewed and they don't feel like like he would be a good fit in the program due to medications.  They recommended PCC in [facility], or [facility].      Writer faxed CD assessment to [facility], [facility], and [facility].\"         Medications:       amLODIPine  5 mg Oral Daily     clobetasol   Topical BID     FLUoxetine  20 mg Oral Daily     folic acid  1 mg Oral Daily     gabapentin  300 mg Oral TID     multivitamin w/minerals  1 tablet Oral Daily     vitamin B1  100 mg Oral Daily          Allergies:   No Known Allergies       Labs:     No results found for this or any previous visit (from the past 24 hour(s)).       Psychiatric Examination:     BP (!) 157/79 (BP Location: Left arm)   Pulse 61   Temp 98.1  F (36.7  C) (Oral)   Resp 18   Ht 1.905 m (6' 3\")   Wt 109.5 kg (241 lb 6.4 oz)   SpO2 97%   BMI 30.17 kg/m    Weight is 241 lbs 6.4 oz  Body mass index is 30.17 kg/m .    Orthostatic Vitals       Most Recent      Sitting Orthostatic /79 08/14 0700    Sitting Orthostatic Pulse (bpm) 61 08/14 0700    Standing Orthostatic /81 08/14 0700    Standing Orthostatic Pulse (bpm) 65 " 08/14 0700         Appearance: dressed in hospital scrubs and poorly groomed  Attitude:  cooperative  Eye Contact:  fair  Mood: less depressed,  Affect:  constricted mobility  Speech:  slurred, due to poor dentiition  Psychomotor Behavior:  No psychomotor agitation or retardation,   Throught Process:  logical, linear and goal oriented  Associations:  no loose associations  Thought Content:  Denies Visual hallucinations, Suicidal ideation, Homicidal thoughts. Reports above mentioned Auditory hallucinations, See discussion above.    Insight:  partial, but improving   Judgement:  fair  Oriented to:  time, person, and place  Attention Span and Concentration:  fair  Recent and Remote Memory:  fair    Clinical Global Impressions  First:  Considering your total clinical experience with this particular patient population, how severe are the patient's symptoms at this time?: 7 (08/06/19 2120)  Compared to the patient's condition at the START of treatment, this patient's condition is:: 4 (08/06/19 2120)  Most recent:  Considering your total clinical experience with this particular patient population, how severe are the patient's symptoms at this time?:   Compared to the patient's condition at the START of treatment, this patient's condition is:          Precautions:     Behavioral Orders   Procedures     Code 1 - Restrict to Unit     Routine Programming     As clinically indicated     Seizure precautions     Status 15     Every 15 minutes.          DIagnoses:     Schizoaffective disorder-bipolar type  Alcohol use disorder, severe with withdrawal.       Plan:   Started by IM on Norvasc, more stable blood pressure.  Seen by CD  and referrals were made, see CTC's note above. Will continue to provide support and structure and encourage to stay at this hospital because of very high risk of relapse.

## 2019-08-14 NOTE — PROGRESS NOTES
Patient informed this writer that his [facility] Care Coordinator Ruby will be soming to the unit to visit him tomorrow at 10:00am

## 2019-08-14 NOTE — PLAN OF CARE
"Patient alert and oriented to person, place, time and situation. Stated mood frustrated, angry. Affect labile, irritable, congruent with stated mood. Feels that  and outpatient treatment facilities are \"stringing me along\" and that there are somehow ulterior motives for him not being accepted at any CD treatment facilities. Unable to talk through or reason with him on this subject but did validate his emotions. He denies any current SI or self harm ideation. Has had some AH ongoing, although nothing disruptive or command in nature. Appetite and sleep are intact. He attended some groups today and showered.   "

## 2019-08-14 NOTE — PROGRESS NOTES
[facility] Care Coordinator: Vikram [phone]. Updated her on plans. Transferred her to pt to ask him if he would be interested in participating in their case management program upon discharge which would help connect him to new therapist, PCP, psychiatrist, etc.

## 2019-08-14 NOTE — PROGRESS NOTES
Pt s mood was calm and had full range of affect. He was social with others. He denied having suicidal ideation or self-harm thoughts. He denied having auditory or visual hallucination. He denied having any withdrawal symptoms. His B/P was elevated. B/P 144/83, P/64. He had no complains. Pt participated in music group.

## 2019-08-15 PROCEDURE — 25000132 ZZH RX MED GY IP 250 OP 250 PS 637: Performed by: PHYSICIAN ASSISTANT

## 2019-08-15 PROCEDURE — 25000132 ZZH RX MED GY IP 250 OP 250 PS 637: Performed by: PSYCHIATRY & NEUROLOGY

## 2019-08-15 PROCEDURE — 90853 GROUP PSYCHOTHERAPY: CPT

## 2019-08-15 PROCEDURE — 12400001 ZZH R&B MH UMMC

## 2019-08-15 PROCEDURE — 99232 SBSQ HOSP IP/OBS MODERATE 35: CPT | Performed by: PSYCHIATRY & NEUROLOGY

## 2019-08-15 PROCEDURE — 25000132 ZZH RX MED GY IP 250 OP 250 PS 637: Performed by: EMERGENCY MEDICINE

## 2019-08-15 PROCEDURE — G0177 OPPS/PHP; TRAIN & EDUC SERV: HCPCS

## 2019-08-15 RX ADMIN — GABAPENTIN 300 MG: 300 CAPSULE ORAL at 08:42

## 2019-08-15 RX ADMIN — GABAPENTIN 300 MG: 300 CAPSULE ORAL at 20:59

## 2019-08-15 RX ADMIN — FOLIC ACID 1 MG: 1 TABLET ORAL at 08:42

## 2019-08-15 RX ADMIN — THIAMINE HCL TAB 100 MG 100 MG: 100 TAB at 08:42

## 2019-08-15 RX ADMIN — CLOBETASOL PROPIONATE: 0.5 CREAM TOPICAL at 09:43

## 2019-08-15 RX ADMIN — MULTIPLE VITAMINS W/ MINERALS TAB 1 TABLET: TAB at 08:42

## 2019-08-15 RX ADMIN — FLUOXETINE 20 MG: 20 CAPSULE ORAL at 08:42

## 2019-08-15 RX ADMIN — GABAPENTIN 300 MG: 300 CAPSULE ORAL at 13:28

## 2019-08-15 RX ADMIN — AMLODIPINE BESYLATE 5 MG: 5 TABLET ORAL at 08:44

## 2019-08-15 RX ADMIN — CLOBETASOL PROPIONATE: 0.5 CREAM TOPICAL at 20:59

## 2019-08-15 ASSESSMENT — ACTIVITIES OF DAILY LIVING (ADL)
DRESS: SCRUBS (BEHAVIORAL HEALTH)
LAUNDRY: WITH SUPERVISION
ORAL_HYGIENE: INDEPENDENT
HYGIENE/GROOMING: INDEPENDENT

## 2019-08-15 ASSESSMENT — MIFFLIN-ST. JEOR: SCORE: 2031.54

## 2019-08-15 NOTE — PROGRESS NOTES
"[facility], [place]   Psychiatric Progress Note        Interim History:     The patient's care was discussed with the treatment team during the daily team meeting and/or staff's chart notes were reviewed.  Staff report patient is more social with peers. He is more open talking about his life. He was somewhat disappointed to hear that he could not go to [facility], but was hopeful to be admitted to a different program. He is firm about going to CD treatment. He denied Suicidal ideation, said that he was in a good mood.  Per CTC: \" Received a call back from [facility] and they said that pt's info was reviewed and they don't feel like like he would be a good fit in the program due to medications.  They recommended PCC in [facility], or [facility].      Writer faxed CD assessment to [facility], [facility], and [facility].\"         Medications:       amLODIPine  5 mg Oral Daily     clobetasol   Topical BID     FLUoxetine  20 mg Oral Daily     folic acid  1 mg Oral Daily     gabapentin  300 mg Oral TID     multivitamin w/minerals  1 tablet Oral Daily     vitamin B1  100 mg Oral Daily          Allergies:   No Known Allergies       Labs:     No results found for this or any previous visit (from the past 24 hour(s)).       Psychiatric Examination:     BP (!) 142/87   Pulse 57   Temp 98.5  F (36.9  C) (Oral)   Resp 18   Ht 1.905 m (6' 3\")   Wt 108.6 kg (239 lb 6.4 oz)   SpO2 97%   BMI 29.92 kg/m    Weight is 239 lbs 6.4 oz  Body mass index is 29.92 kg/m .    Orthostatic Vitals       Most Recent      Sitting Orthostatic /87 08/15 0745    Sitting Orthostatic Pulse (bpm) 57 08/15 0745    Standing Orthostatic /84 08/15 0745    Standing Orthostatic Pulse (bpm) 59 08/15 0745         Appearance: dressed in hospital scrubs Attitude:  cooperative  Eye Contact:  fair  Mood: \"good\",  Affect:  constricted mobility  Speech:  slurred, due to poor dentiition  Psychomotor Behavior:  No " psychomotor agitation or retardation,   Throught Process:  logical, linear and goal oriented  Associations:  no loose associations  Thought Content:  Denies Visual hallucinations, Suicidal ideation, Homicidal thoughts. Reports above mentioned Auditory hallucinations, See discussion above.    Insight:  partial, but improving   Judgement:  fair  Oriented to:  time, person, and place  Attention Span and Concentration:  fair  Recent and Remote Memory:  fair    Clinical Global Impressions  First:  Considering your total clinical experience with this particular patient population, how severe are the patient's symptoms at this time?: 7 (08/06/19 2120)  Compared to the patient's condition at the START of treatment, this patient's condition is:: 4 (08/06/19 2120)  Most recent:  Considering your total clinical experience with this particular patient population, how severe are the patient's symptoms at this time?:   Compared to the patient's condition at the START of treatment, this patient's condition is: 4/2 8/15/2019          Precautions:     Behavioral Orders   Procedures     Code 1 - Restrict to Unit     Routine Programming     As clinically indicated     Seizure precautions     Status 15     Every 15 minutes.          DIagnoses:     Schizoaffective disorder-bipolar type  Alcohol use disorder, severe with withdrawal.       Plan:   Started by IM on Norvasc, more stable blood pressure.  Seen by CD  and referrals were made, see CTC's note above. Will continue to provide support and structure and encourage to stay at this hospital because of very high risk of relapse.

## 2019-08-15 NOTE — PROGRESS NOTES
"   08/14/19 2000   Group Therapy Session   Group Attendance attended group session   Total Time (minutes) 50   Group Type psychotherapeutic   Group Topic Covered other (see comments)   Patient Participation/Contribution cooperative with task;discussed personal experience with topic;offered helpful suggestions to peers;listened actively   Group focused on identifying things one can and cannot control by making a paper \"fortune-teller\" and labeling it with things he CAN control, such as using deep breathing skills to reduce stress and add clarity. He was actively engaged in the activity and the group discussion though he worried that he \"talked too much,\" which he did not.    Neville reported feeling exhausted. He presented as sad though he was pleasant with others. He processed that he felt frustrated like he was being \"deceived.\" This writer's impression was that he is confused about his plan going forward. This writer encouraged him take a deep breath and then to ask for clarification.       "

## 2019-08-15 NOTE — PROGRESS NOTES
08/15/19 1313   Clinical Impression   Affect Appropriate to situation   Orientation Oriented to person, place and time   Appearance and ADLs General cleanliness observed in most areas   Attention to Internal Stimuli No observed signs   Interaction Skills Interacts appropriately with staff   Ability to Communicate Needs Independent   Verbal Content Articulate   Ability to Maintain Boundaries Maintains appropriate physical boundaries;Maintains appropriate verbal boundaries   Participation Initiates participation;Independently participates   Concentration Concentrates 50 minutes   Ability to Concentrate Without difficulty   Follows and Comprehends Directions Independently follows multi-step directions   Memory Delayed and immediate recall intact   Organization Independently organizes all tasks   Decision Making Independent   Planning and Problem Solving Independently plans ahead   Ability to Apply and Learn Concepts Applies within group structure   Frustrations / Stress Tolerance Independently identifies sources of frustration/stress;Independently identifies skills    Level of Insight Insightful into needs, issues, goals   Self Esteem Can identify positives   Social Supports Has knowledge of support systems

## 2019-08-15 NOTE — PLAN OF CARE
Problem: OT General Care Plan  Goal: OT Frequency  Description  Pt will practice using >2 coping strategies to manage stress and reduce symptoms to demonstrate increased readiness for discharge.     Attended 2/3 occupational therapy groups offered this date. Overall congruent affect and full engagement.    Occupational therapy clinic to complete a multi-day project. Pt Response: Demonstrated consistent performance skills as observed on previous dates. Reported feeling satisfied with end result, appearing somewhat proud.   Sleep hygiene group including education with interactive game, sleep self-assessment, and sleep journal for follow-through. Education was provided on importance of adequate sleep for optimal occupational performance, cognitive well-being and engagement in daily life. Pt Response: Accepting of material and actively participated throughout group.     Outcome: Improving

## 2019-08-15 NOTE — PROGRESS NOTES
CLINICAL NUTRITION SERVICES - REASSESSMENT NOTE     Nutrition Prescription    RECOMMENDATIONS FOR MDs/PROVIDERS TO ORDER:  None at this time    Malnutrition Status:    Patient does not meet two of the above criteria necessary for diagnosing malnutrition    Recommendations already ordered by Registered Dietitian (RD):  None at this time        EVALUATION OF THE PROGRESS TOWARD GOALS   Diet: Regular  Intake:   Per Psych Associate note from 8/14: Pt's appetite was good  No % intake data found in flowsheets  Per pt, Merrill has a good appetite and is eating >90% of TID meals. He has no problem selecting foods from the Regular menu that he can eat without dentures.      NEW FINDINGS   On 8/12, the pt's diet was switched back to Regular from Mechanical/Soft per pt preference.     Weight history: the pt has gained about 5# since admission on 8/5/19.   Wt Readings from Last 10 Encounters:   08/15/19  108.6 kg (239 lb 6.4 oz)   08/13/19  109.5 kg (241 lb 6.4 oz)   08/11/19  62.6 kg (138 lb)   08/08/19  106.1 kg (233 lb 12.8 oz)   08/06/19 106.9 kg (235 lb 9.6 oz)   05/05/19  105 kg (231 lb 8 oz) (CE)   03/20/19 106.6 kg (235 lb)        MALNUTRITION  % Intake: No decreased intake noted  % Weight Loss: None noted  Subcutaneous Fat Loss: None observed  Muscle Loss: None observed  Fluid Accumulation/Edema: None noted  Malnutrition Diagnosis: Patient does not meet two of the above criteria necessary for diagnosing malnutrition    Previous Goals   Patient to consume % of nutritionally adequate meal trays TID, or the equivalent with supplements/snacks.  Evaluation: Met    Previous Nutrition Diagnosis  Inadequate oral intake related to difficulty eating d/t poor dentition as evidenced by the pt consuming 50%-75% of meals for >5 days.   Evaluation: Improving    CURRENT NUTRITION DIAGNOSIS  No nutrition diagnosis at this time     INTERVENTIONS  Implementation  - Nutrition Education: Provided education on the importance of  adequate intake and reviewed the principles of MyPlate  - Encouraged pt to continue eating >75% of TID meals while admitted.      Goals  Patient to consume % of nutritionally adequate meal trays TID, or the equivalent with supplements/snacks.    Monitoring/Evaluation  No nutrition follow-up warranted at this time. RD to sign off. Please consult if further needs arise.       Tessie Bay RD, LD  Pager: [phone]

## 2019-08-15 NOTE — PROGRESS NOTES
"Pt had uneventful shift. Pt was calm, cooperative and pleasant upon approach. Pt was visibile in milieu for the majority part of the shift watching movies with peers. Pt endorsed minor depression and anxiety, denied SI/SIb ideations. Pt is hoping to discharge near the future and disappointed that he couldn't be bale to go to a CD treatment, stated \"a few places denied me and I'm really frustrated about that.\" Pt's appetite was good and sleeping/napping well. No behavioral issues. No major concerns from the pt.         08/14/19 1944   Behavioral Health   Hallucinations auditory   Thinking poor concentration   Orientation time: oriented;date: oriented;place: oriented   Memory baseline memory   Insight insight appropriate to situation   Judgement impaired   Eye Contact at examiner   Affect blunted, flat   Mood mood is calm   Physical Appearance/Attire attire appropriate to age and situation   Hygiene neglected grooming - unclean body, hair, teeth   Suicidality   (denied )   1. Wish to be Dead No   2. Non-Specific Active Suicidal Thoughts  No   Self Injury   (denied )   Elopement   (denied )   Activity withdrawn;isolative   Speech coherent;clear   Medication Sensitivity no observed side effects   Psychomotor / Gait balanced;steady   Psycho Education   Type of Intervention 1:1 intervention   Response participates, initiates socially appropriate   Hours 0.5   Activities of Daily Living   Hygiene/Grooming independent   Oral Hygiene independent   Dress street clothes;scrubs (behavioral health)   Laundry with supervision   Room Organization independent   Activity   Activity Assistance Provided independent     "

## 2019-08-16 PROCEDURE — 25000132 ZZH RX MED GY IP 250 OP 250 PS 637: Performed by: PSYCHIATRY & NEUROLOGY

## 2019-08-16 PROCEDURE — 99232 SBSQ HOSP IP/OBS MODERATE 35: CPT | Performed by: PSYCHIATRY & NEUROLOGY

## 2019-08-16 PROCEDURE — 25000132 ZZH RX MED GY IP 250 OP 250 PS 637: Performed by: EMERGENCY MEDICINE

## 2019-08-16 PROCEDURE — 12400001 ZZH R&B MH UMMC

## 2019-08-16 PROCEDURE — 25000132 ZZH RX MED GY IP 250 OP 250 PS 637: Performed by: PHYSICIAN ASSISTANT

## 2019-08-16 RX ORDER — HYDROXYZINE HYDROCHLORIDE 50 MG/1
50 TABLET, FILM COATED ORAL EVERY 4 HOURS PRN
Status: DISCONTINUED | OUTPATIENT
Start: 2019-08-16 | End: 2019-08-27 | Stop reason: HOSPADM

## 2019-08-16 RX ORDER — AMLODIPINE BESYLATE 2.5 MG/1
2.5 TABLET ORAL ONCE
Status: COMPLETED | OUTPATIENT
Start: 2019-08-16 | End: 2019-08-16

## 2019-08-16 RX ADMIN — GABAPENTIN 300 MG: 300 CAPSULE ORAL at 22:18

## 2019-08-16 RX ADMIN — THIAMINE HCL TAB 100 MG 100 MG: 100 TAB at 08:15

## 2019-08-16 RX ADMIN — AMLODIPINE BESYLATE 2.5 MG: 2.5 TABLET ORAL at 10:58

## 2019-08-16 RX ADMIN — FOLIC ACID 1 MG: 1 TABLET ORAL at 08:15

## 2019-08-16 RX ADMIN — Medication 25 MG: at 14:53

## 2019-08-16 RX ADMIN — GABAPENTIN 300 MG: 300 CAPSULE ORAL at 14:16

## 2019-08-16 RX ADMIN — HYDROXYZINE HYDROCHLORIDE 50 MG: 50 TABLET ORAL at 19:11

## 2019-08-16 RX ADMIN — CLOBETASOL PROPIONATE: 0.5 CREAM TOPICAL at 08:17

## 2019-08-16 RX ADMIN — MULTIPLE VITAMINS W/ MINERALS TAB 1 TABLET: TAB at 08:15

## 2019-08-16 RX ADMIN — HYDROXYZINE HYDROCHLORIDE 25 MG: 25 TABLET, FILM COATED ORAL at 10:58

## 2019-08-16 RX ADMIN — FLUOXETINE 20 MG: 20 CAPSULE ORAL at 08:15

## 2019-08-16 RX ADMIN — GABAPENTIN 300 MG: 300 CAPSULE ORAL at 08:15

## 2019-08-16 RX ADMIN — AMLODIPINE BESYLATE 5 MG: 5 TABLET ORAL at 08:15

## 2019-08-16 ASSESSMENT — ACTIVITIES OF DAILY LIVING (ADL)
ORAL_HYGIENE: INDEPENDENT
DRESS: SCRUBS (BEHAVIORAL HEALTH)
DRESS: SCRUBS (BEHAVIORAL HEALTH);INDEPENDENT
HYGIENE/GROOMING: INDEPENDENT
HYGIENE/GROOMING: HANDWASHING;SHOWER;INDEPENDENT
ORAL_HYGIENE: INDEPENDENT

## 2019-08-16 NOTE — PROGRESS NOTES
"[facility] [place]   Psychiatric Progress Note        Interim History:     The patient's care was discussed with the treatment team during the daily team meeting and/or staff's chart notes were reviewed.  Staff report patient is more social with peers. He is more open talking about his life. He was somewhat disappointed to hear that he could not go to [facility], but was hopeful to be admitted to a different program. Today he reported being highly anxious because of not knowing if he was accepted to CD treatment (he is being reviewed by [facility] program). Admitted that he had strong urges to drink. I suggested to start Naltrexone and stay at this hospital as if he is discharged, he, most likely, would immediately relapse and become suicidal again. He agreed that he would benefit from staying at this hospital, agreed to start Naltrexone and increase Vistaril. I also offered him Seroquel, he declined, said Seroquel was making him more restless.         Medications:       [START ON 8/17/2019] amLODIPine  7.5 mg Oral Daily     clobetasol   Topical BID     FLUoxetine  20 mg Oral Daily     folic acid  1 mg Oral Daily     gabapentin  300 mg Oral TID     multivitamin w/minerals  1 tablet Oral Daily     naltrexone  25 mg Oral Daily     vitamin B1  100 mg Oral Daily          Allergies:   No Known Allergies       Labs:     No results found for this or any previous visit (from the past 24 hour(s)).       Psychiatric Examination:     BP (!) 158/71 (BP Location: Right arm)   Pulse 54   Temp 98.3  F (36.8  C) (Oral)   Resp 16   Ht 1.905 m (6' 3\")   Wt 108.6 kg (239 lb 6.4 oz)   SpO2 97%   BMI 29.92 kg/m    Weight is 239 lbs 6.4 oz  Body mass index is 29.92 kg/m .    Orthostatic Vitals       Most Recent      Sitting Orthostatic /62 08/16 0700    Sitting Orthostatic Pulse (bpm) 57 08/16 0700    Standing Orthostatic /70 08/16 0700    Standing Orthostatic Pulse (bpm) 59 08/16 0700 " "        Appearance: dressed in hospital scrubs Attitude:  cooperative  Eye Contact:  fair  Mood: \"good\", but more anxious  Affect:  constricted mobility  Speech:  slurred, due to poor dentiition  Psychomotor Behavior:  No psychomotor agitation or retardation,   Throught Process:  logical, linear and goal oriented  Associations:  no loose associations  Thought Content:  Denies Visual hallucinations, Suicidal ideation, Homicidal thoughts. Reports above mentioned Auditory hallucinations, See discussion above.    Insight:  partial, but improving   Judgement:  fair  Oriented to:  time, person, and place  Attention Span and Concentration:  fair  Recent and Remote Memory:  fair    Clinical Global Impressions  First:  Considering your total clinical experience with this particular patient population, how severe are the patient's symptoms at this time?: 7 (08/06/19 2120)  Compared to the patient's condition at the START of treatment, this patient's condition is:: 4 (08/06/19 2120)  Most recent:  Considering your total clinical experience with this particular patient population, how severe are the patient's symptoms at this time?:   Compared to the patient's condition at the START of treatment, this patient's condition is: 4/2 8/15/2019          Precautions:     Behavioral Orders   Procedures     Code 1 - Restrict to Unit     Routine Programming     As clinically indicated     Seizure precautions     Status 15     Every 15 minutes.          DIagnoses:     Schizoaffective disorder-bipolar type  Alcohol use disorder, severe with withdrawal.       Plan:   Will increase Vistaril and start on Naltrexone, See discussion above. I discussed with the patient his concerns about lung nodule, told him that, per IM note, nodule was discovered in 2008 and because he was asymptomatic, follow up with PCP was recommended. He is being reviewed with by [facility] CD program. Will continue to provide support and structure and encourage to " stay at this hospital because of very high risk of relapse.

## 2019-08-16 NOTE — PROGRESS NOTES
08/15/19 2000   Group Therapy Session   Group Attendance attended group session   Total Time (minutes) 45   Group Type psychotherapeutic   Group Topic Covered other (see comments)   Patient Participation/Contribution cooperative with task;discussed personal experience with topic;expressed understanding of topic;listened actively   Patient participated in psychotherapy group which included a relaxation exercise, a mindfulness activity focusing on the 5 senses and then processing as a group. Neville reported feeling anxious and presented pleasant though sleepy (resting head on table or hands, eyes red and droopy). When asked about his sleep, he reported being awakened by staff talking at the  several times. He also reported the air conditioning was extremely cold. This writer encouraged him to discuss the AC with his staff.     Neville participated in the activities and engaged in the group discussion.

## 2019-08-16 NOTE — PLAN OF CARE
"Pt in bright spirits this am. He later asked for vistaril for anxiety. Pt states his mood is \"hopeful.\" He denies si; rates depression 5-6, anxiety 8. His concentration is \"fair.\" Pt denies other MH sx; is social, and in the milieu. He is going to ask his Dr for something more for anxiety sx. Pt's goal for the day is \"to go to all the groups.\"  "

## 2019-08-16 NOTE — PROGRESS NOTES
Writer spoke with [facility] intake person ([phone]) to discuss referral. They will be reviewing the referral today and will call back if pt is accepted into the program. If accepted, PV will want to do a phone interview but that will not happen until next week.

## 2019-08-17 PROCEDURE — 25000132 ZZH RX MED GY IP 250 OP 250 PS 637: Performed by: PSYCHIATRY & NEUROLOGY

## 2019-08-17 PROCEDURE — 25000132 ZZH RX MED GY IP 250 OP 250 PS 637: Performed by: PHYSICIAN ASSISTANT

## 2019-08-17 PROCEDURE — 25000132 ZZH RX MED GY IP 250 OP 250 PS 637: Performed by: EMERGENCY MEDICINE

## 2019-08-17 PROCEDURE — 12400001 ZZH R&B MH UMMC

## 2019-08-17 RX ADMIN — Medication 25 MG: at 08:06

## 2019-08-17 RX ADMIN — FOLIC ACID 1 MG: 1 TABLET ORAL at 08:06

## 2019-08-17 RX ADMIN — ACETAMINOPHEN 650 MG: 325 TABLET, FILM COATED ORAL at 16:47

## 2019-08-17 RX ADMIN — FLUOXETINE 20 MG: 20 CAPSULE ORAL at 08:07

## 2019-08-17 RX ADMIN — AMLODIPINE BESYLATE 7.5 MG: 2.5 TABLET ORAL at 08:06

## 2019-08-17 RX ADMIN — MULTIPLE VITAMINS W/ MINERALS TAB 1 TABLET: TAB at 08:06

## 2019-08-17 RX ADMIN — GABAPENTIN 300 MG: 300 CAPSULE ORAL at 08:07

## 2019-08-17 RX ADMIN — THIAMINE HCL TAB 100 MG 100 MG: 100 TAB at 08:06

## 2019-08-17 RX ADMIN — GABAPENTIN 300 MG: 300 CAPSULE ORAL at 20:47

## 2019-08-17 RX ADMIN — CLOBETASOL PROPIONATE: 0.5 CREAM TOPICAL at 11:07

## 2019-08-17 ASSESSMENT — ACTIVITIES OF DAILY LIVING (ADL)
ORAL_HYGIENE: INDEPENDENT
LAUNDRY: WITH SUPERVISION
HYGIENE/GROOMING: INDEPENDENT
ORAL_HYGIENE: INDEPENDENT
DRESS: SCRUBS (BEHAVIORAL HEALTH)
DRESS: SCRUBS (BEHAVIORAL HEALTH)
HYGIENE/GROOMING: INDEPENDENT

## 2019-08-17 NOTE — PROGRESS NOTES
Pt observed pacing in cano and he stated to the writer that one of the peers is bothering him. He was told to distance or state to her to give space. He denies suicidal ideations or having any a hallucinations.     08/17/19 1339   Behavioral Health   Hallucinations denies / not responding to hallucinations   Thinking distractable   Orientation time: oriented;date: oriented;place: oriented;person: oriented   Memory baseline memory   Insight poor   Judgement impaired   Eye Contact at examiner   Affect full range affect   Mood depressed;anxious   Physical Appearance/Attire appears stated age   Hygiene well groomed   Suicidality other (see comments)  (Denies)   1. Wish to be Dead No   2. Non-Specific Active Suicidal Thoughts  No   Self Injury other (see comment)  (Denies)   Psychomotor / Gait balanced;steady   Psycho Education   Type of Intervention 1:1 intervention   Response participates, initiates socially appropriate   Hours 0.5   Activities of Daily Living   Hygiene/Grooming independent   Oral Hygiene independent   Dress scrubs (behavioral health)   Laundry with supervision   Room Organization independent   Activity   Activity Assistance Provided independent

## 2019-08-18 PROCEDURE — 25000132 ZZH RX MED GY IP 250 OP 250 PS 637: Performed by: EMERGENCY MEDICINE

## 2019-08-18 PROCEDURE — 25000132 ZZH RX MED GY IP 250 OP 250 PS 637: Performed by: PSYCHIATRY & NEUROLOGY

## 2019-08-18 PROCEDURE — 12400001 ZZH R&B MH UMMC

## 2019-08-18 PROCEDURE — H2032 ACTIVITY THERAPY, PER 15 MIN: HCPCS

## 2019-08-18 PROCEDURE — 25000132 ZZH RX MED GY IP 250 OP 250 PS 637: Performed by: PHYSICIAN ASSISTANT

## 2019-08-18 RX ADMIN — GABAPENTIN 300 MG: 300 CAPSULE ORAL at 08:18

## 2019-08-18 RX ADMIN — FOLIC ACID 1 MG: 1 TABLET ORAL at 08:14

## 2019-08-18 RX ADMIN — GABAPENTIN 300 MG: 300 CAPSULE ORAL at 21:11

## 2019-08-18 RX ADMIN — GABAPENTIN 300 MG: 300 CAPSULE ORAL at 13:41

## 2019-08-18 RX ADMIN — MULTIPLE VITAMINS W/ MINERALS TAB 1 TABLET: TAB at 08:18

## 2019-08-18 RX ADMIN — THIAMINE HCL TAB 100 MG 100 MG: 100 TAB at 08:18

## 2019-08-18 RX ADMIN — Medication 25 MG: at 08:14

## 2019-08-18 RX ADMIN — AMLODIPINE BESYLATE 7.5 MG: 2.5 TABLET ORAL at 08:14

## 2019-08-18 RX ADMIN — FLUOXETINE 20 MG: 20 CAPSULE ORAL at 08:14

## 2019-08-18 RX ADMIN — NICOTINE POLACRILEX 4 MG: 4 GUM, CHEWING ORAL at 21:11

## 2019-08-18 RX ADMIN — CLOBETASOL PROPIONATE: 0.5 CREAM TOPICAL at 08:14

## 2019-08-18 ASSESSMENT — ACTIVITIES OF DAILY LIVING (ADL)
LAUNDRY: WITH SUPERVISION
HYGIENE/GROOMING: HANDWASHING;SHOWER;INDEPENDENT
DRESS: INDEPENDENT
DRESS: SCRUBS (BEHAVIORAL HEALTH);INDEPENDENT
ORAL_HYGIENE: INDEPENDENT
ORAL_HYGIENE: INDEPENDENT
HYGIENE/GROOMING: INDEPENDENT

## 2019-08-18 ASSESSMENT — MIFFLIN-ST. JEOR: SCORE: 2034.26

## 2019-08-18 NOTE — PLAN OF CARE
Brief medicine note:  - BPs improved after amlodipine administered. Continue with current dosing. Medicine signing off. Please feel free to call with questions.       Castro Holguin PA-C  Internal Medicine Hospitalist   [facility] Hospitalist group  [phone]

## 2019-08-18 NOTE — PROGRESS NOTES
Pt c/o having generalized malaise at 4:10 pm.  I don t feel well . T/98.5 (o), P/51, B/P 158/78. Pulse is low. This writer offered pt Tylenol for comfort. Effective. Pt told this writer that he felt better at 5:35 pm. Recheck his vital later, it was B/P 152/61, P/52 at 1742 hr. At 2044 hr B/P 127/81, P/55. Pt paced in the hallway to exercise. Pt is asymptomatic. Pt's primary nurse had informed his on call psychiatrist. Staff will continue to monitor pt's vital signs per protocol.

## 2019-08-18 NOTE — PLAN OF CARE
"Pt is calm and in good spirits; states his mood is \"optimistic.\" He denies si; rates depression and anxiety both 7. He said he would attend any groups held, and \"tries to be\" social. He has \"a little bit\" of racing thoughts. Pt was encouraged to take all his meds today-even if he felt irritable at some point. He agreed to do so. His goal for the day is \"to do some pacing, and ride the exercise bike.\"  "

## 2019-08-18 NOTE — PROGRESS NOTES
"Pt refused his 2 pm gabapentin. He said ,\"Just leave me alone.\" Explained that this may be helpful for his anxiety, but he declined.  "

## 2019-08-18 NOTE — PROGRESS NOTES
Patient reports feeling anxious, denies current SI/SIB. Patient states he has felt sedated and feels he has been uncharacteristically irritable, and suspects his medication changes are to blame. Patient also reports worrying about his high blood pressure and low pulse. Patient was visible in the milieu but mostly withdrawn with flat affect during the shift.     08/17/19 2200   Behavioral Health   Hallucinations denies / not responding to hallucinations   Thinking poor concentration   Orientation person: oriented;place: oriented;date: oriented;time: oriented   Memory baseline memory   Insight admits / accepts   Judgement impaired   Eye Contact at examiner   Affect full range affect   Mood anxious   Physical Appearance/Attire appears stated age;attire appropriate to age and situation   Hygiene well groomed   Suicidality other (see comments)  (Denies)   1. Wish to be Dead No   2. Non-Specific Active Suicidal Thoughts  No   Self Injury other (see comment)  (Denies)   Activity withdrawn   Speech clear;coherent   Medication Sensitivity sedation   Psychomotor / Gait balanced;steady   Activities of Daily Living   Hygiene/Grooming independent   Oral Hygiene independent   Dress scrubs (behavioral health)   Room Organization independent

## 2019-08-19 PROCEDURE — 25000132 ZZH RX MED GY IP 250 OP 250 PS 637: Performed by: EMERGENCY MEDICINE

## 2019-08-19 PROCEDURE — 12400001 ZZH R&B MH UMMC

## 2019-08-19 PROCEDURE — 25000132 ZZH RX MED GY IP 250 OP 250 PS 637: Performed by: PHYSICIAN ASSISTANT

## 2019-08-19 PROCEDURE — 99232 SBSQ HOSP IP/OBS MODERATE 35: CPT | Performed by: PSYCHIATRY & NEUROLOGY

## 2019-08-19 PROCEDURE — 25000132 ZZH RX MED GY IP 250 OP 250 PS 637: Performed by: PSYCHIATRY & NEUROLOGY

## 2019-08-19 RX ORDER — NALTREXONE HYDROCHLORIDE 50 MG/1
50 TABLET, FILM COATED ORAL DAILY
Status: DISCONTINUED | OUTPATIENT
Start: 2019-08-20 | End: 2019-08-27 | Stop reason: HOSPADM

## 2019-08-19 RX ADMIN — GABAPENTIN 300 MG: 300 CAPSULE ORAL at 20:56

## 2019-08-19 RX ADMIN — CLOBETASOL PROPIONATE: 0.5 CREAM TOPICAL at 20:57

## 2019-08-19 RX ADMIN — THIAMINE HCL TAB 100 MG 100 MG: 100 TAB at 08:29

## 2019-08-19 RX ADMIN — FLUOXETINE 20 MG: 20 CAPSULE ORAL at 08:29

## 2019-08-19 RX ADMIN — GABAPENTIN 300 MG: 300 CAPSULE ORAL at 08:29

## 2019-08-19 RX ADMIN — FOLIC ACID 1 MG: 1 TABLET ORAL at 08:29

## 2019-08-19 RX ADMIN — MULTIPLE VITAMINS W/ MINERALS TAB 1 TABLET: TAB at 08:29

## 2019-08-19 RX ADMIN — Medication 25 MG: at 08:29

## 2019-08-19 RX ADMIN — AMLODIPINE BESYLATE 7.5 MG: 2.5 TABLET ORAL at 08:29

## 2019-08-19 RX ADMIN — CLOBETASOL PROPIONATE: 0.5 CREAM TOPICAL at 13:28

## 2019-08-19 RX ADMIN — GABAPENTIN 300 MG: 300 CAPSULE ORAL at 13:28

## 2019-08-19 NOTE — PROGRESS NOTES
"Writer approached pt to say [facility] will not accept him into their program. We discussed [facility] however pt stated he just wants to discharge today. Writer asked him later if he had a provider to refill medications, pt stated he does but would not say who. He stated \"I'll be ok. I just want out of here. I can't take him anymore.'    Writer called [facility] to check on referral that was supposedly made. They did not receive referral. Pt signed ANNIE for [facility] and stated he is willing to go to [facility]. Referral faxed.  "

## 2019-08-19 NOTE — PROGRESS NOTES
"The pt continues to complain about the length of time it is taking to find placement. His mood is pretty stable, although he tends to swing back and forth between calm and irritable, even talking to himself at times. He showers daily, eats well and reports sleeping \"ok\". He could be seen pacing the halls at length with another, discussing his frustration at \"how long this process effing takes.\" Otherwise he is calm and pleasant. He attends groups and participates appropriately. The pt denies SI/SIB urges and hallucinations.     08/19/19 1408   Behavioral Health   Hallucinations denies / not responding to hallucinations   Thinking intact   Orientation person: oriented;place: oriented;date: oriented;time: oriented   Memory baseline memory   Insight insight appropriate to situation   Judgement intact   Eye Contact at examiner   Affect tense;full range affect   Mood labile;mood is calm;irritable   Physical Appearance/Attire attire appropriate to age and situation   Hygiene well groomed   Suicidality other (see comments)  (pt denies)   1. Wish to be Dead No   2. Non-Specific Active Suicidal Thoughts  No   Self Injury other (see comment)  (pt denies)   Elopement Statements about wanting to leave  (pt is anxious to discharge to treatment)   Activity other (see comment)  (visible at times)   Speech clear;coherent   Medication Sensitivity no observed side effects;no stated side effects   Psychomotor / Gait balanced;steady     "

## 2019-08-19 NOTE — PROGRESS NOTES
[facility] declined pt citing the need for mental health stabilization. Pt has been doing well on the unit. He attends most groups and is willing to try an IRTs program.    Writer checked on referral to [facility] and found out they had not received one. Writer faxed referral requesting first available bed. Writer also faxed referrals to: [facility] and [facility] in case [facility] declines pt.

## 2019-08-19 NOTE — PROGRESS NOTES
Pt had a good shift, was visible in milieu and attended groups. Pt was appropriate with his peers and in groups. Pt stated that he was not irritable this evening about his placement situation and is eager to get out of here, but does not want to return to [place] because it will be too easy to start drinking again. Pt stated that he is fearful about not having a concrete plan setup for his discharge and this is where is irritability is coming from. Pt denied SI/SIB.

## 2019-08-19 NOTE — PLAN OF CARE
"  Problem: OT General Care Plan  Goal: OT Frequency  Description  Pt will practice using >2 coping strategies to manage stress and reduce symptoms to demonstrate increased readiness for discharge.     Attended 3/3 occupational therapy groups offered this date. Overall content, somewhat sad affect yet full engagement.      Topic group for general health and coping: Collage focused on insight development specifically personal development, supports/barriers, goals, and interests related to recovery. Pt Response: Able to organize and apply concepts to collage, sequence activity, and clean-up. Demonstrated openness with peers, including wellness-based words with the intent to treat his body well and goals for healthy relationships. Reported feeling down d/t length of stay - wishing he could leave. Stated his mom is \"all ready for me.. I have a hard time accepting help but I don't want to let them down\".     Pt attended general health and coping group focused on the '8 Dimensions of Wellness'. Discussion-based group was used to facilitate insight development on holistic whole-person wellness related to occupational performance and satisfaction.    Outcome: Improving     "

## 2019-08-19 NOTE — PROGRESS NOTES
"[facility], [place]   Psychiatric Progress Note        Interim History:     The patient's care was discussed with the treatment team during the daily team meeting and/or staff's chart notes were reviewed.  Staff report patient is more social with peers. He is more open talking about his life. He is very disappointed to hear that number of CD programs refused to take him and even put 12 hour intent to leave form over weekend. I met with him today. We discussed most likely outcome of his discharge AMA before he secures place at CD program. He agreed that he, most likely, would relapse and start drinking. He agreed with my suggestion to stay at this hospital for at least few more days, agreed to increase his Prozac and Naltrexone doses. He denied Suicidal ideation, overall, shows better insight and judgment.     Per [facility]'s note: \"Luis alfredo, [facility] and [facility] declined pt citing the need for mental health stabilization. Pt has been doing well on the unit. He attends most groups and is willing to try an IRTs program.    Writer checked on referral to [facility] and found out they had not received one. Writer faxed referral requesting first available bed. Writer also faxed referrals to: [facility] and [facility] in case [facility] declines pt.\"       Medications:       amLODIPine  7.5 mg Oral Daily     clobetasol   Topical BID     [START ON 8/20/2019] FLUoxetine  40 mg Oral Daily     folic acid  1 mg Oral Daily     gabapentin  300 mg Oral TID     multivitamin w/minerals  1 tablet Oral Daily     [START ON 8/20/2019] naltrexone  50 mg Oral Daily     vitamin B1  100 mg Oral Daily          Allergies:   No Known Allergies       Labs:     No results found for this or any previous visit (from the past 24 hour(s)).       Psychiatric Examination:     BP (!) 149/76   Pulse 55   Temp 98.2  F (36.8  C) (Oral)   Resp 16   Ht 1.905 m (6' 3\")   Wt 108.9 kg (240 lb)   SpO2 " "97%   BMI 30.00 kg/m    Weight is 240 lbs 0 oz  Body mass index is 30 kg/m .    Orthostatic Vitals       Most Recent      Sitting Orthostatic /74 08/19 0802    Sitting Orthostatic Pulse (bpm) 72 08/19 0802    Standing Orthostatic /83 08/19 0802    Standing Orthostatic Pulse (bpm) 65 08/19 0802         Appearance: dressed in hospital scrubs Attitude:  cooperative  Eye Contact:  fair  Mood: \"good\", but more anxious  Affect:  constricted mobility  Speech:  Better, normal speech production  Psychomotor Behavior:  No psychomotor agitation or retardation,   Throught Process:  logical, linear and goal oriented  Associations:  no loose associations  Thought Content:  Denies Visual hallucinations, Suicidal ideation, Homicidal thoughts. Reports above mentioned Auditory hallucinations, See discussion above.    Insight:  partial, but improving   Judgement:  fair  Oriented to:  time, person, and place  Attention Span and Concentration:  fair  Recent and Remote Memory:  fair    Clinical Global Impressions  First:  Considering your total clinical experience with this particular patient population, how severe are the patient's symptoms at this time?: 7 (08/06/19 2120)  Compared to the patient's condition at the START of treatment, this patient's condition is:: 4 (08/06/19 2120)  Most recent:  Considering your total clinical experience with this particular patient population, how severe are the patient's symptoms at this time?:   Compared to the patient's condition at the START of treatment, this patient's condition is: 4/2 8/15/2019          Precautions:     Behavioral Orders   Procedures     Code 1 - Restrict to Unit     Routine Programming     As clinically indicated     Seizure precautions     Status 15     Every 15 minutes.          DIagnoses:     Schizoaffective disorder-bipolar type  Alcohol use disorder, severe with withdrawal.       Plan:   Will increase Prozac and Natrexone, See discussion above. I discussed " with the patient his plan to leave [place], he agreed to stay and showed an interest in IRTs. His referral will be refaxed to [place]. Will continue to provide support and structure and encourage to stay at this hospital because of very high risk of relapse.

## 2019-08-20 PROCEDURE — G0177 OPPS/PHP; TRAIN & EDUC SERV: HCPCS

## 2019-08-20 PROCEDURE — 12400001 ZZH R&B MH UMMC

## 2019-08-20 PROCEDURE — 25000132 ZZH RX MED GY IP 250 OP 250 PS 637: Performed by: PSYCHIATRY & NEUROLOGY

## 2019-08-20 PROCEDURE — 25000132 ZZH RX MED GY IP 250 OP 250 PS 637: Performed by: PHYSICIAN ASSISTANT

## 2019-08-20 PROCEDURE — 25000132 ZZH RX MED GY IP 250 OP 250 PS 637: Performed by: EMERGENCY MEDICINE

## 2019-08-20 RX ADMIN — AMLODIPINE BESYLATE 7.5 MG: 2.5 TABLET ORAL at 08:07

## 2019-08-20 RX ADMIN — GABAPENTIN 300 MG: 300 CAPSULE ORAL at 08:07

## 2019-08-20 RX ADMIN — THIAMINE HCL TAB 100 MG 100 MG: 100 TAB at 08:07

## 2019-08-20 RX ADMIN — FOLIC ACID 1 MG: 1 TABLET ORAL at 08:07

## 2019-08-20 RX ADMIN — FLUOXETINE 40 MG: 20 CAPSULE ORAL at 08:07

## 2019-08-20 RX ADMIN — MULTIPLE VITAMINS W/ MINERALS TAB 1 TABLET: TAB at 08:07

## 2019-08-20 RX ADMIN — GABAPENTIN 300 MG: 300 CAPSULE ORAL at 21:06

## 2019-08-20 RX ADMIN — GABAPENTIN 300 MG: 300 CAPSULE ORAL at 13:58

## 2019-08-20 RX ADMIN — NALTREXONE HYDROCHLORIDE 50 MG: 50 TABLET, FILM COATED ORAL at 08:07

## 2019-08-20 ASSESSMENT — MIFFLIN-ST. JEOR: SCORE: 2035.62

## 2019-08-20 ASSESSMENT — ACTIVITIES OF DAILY LIVING (ADL)
ORAL_HYGIENE: INDEPENDENT
LAUNDRY: WITH SUPERVISION
DRESS: INDEPENDENT
HYGIENE/GROOMING: INDEPENDENT

## 2019-08-20 NOTE — PROGRESS NOTES
"Pt appears pleasant, active ans social in the milieu with peers and staff. Pt denies SI, SIb, anxiety 4 and depression 3. Pt shared the following  \"I am happy to have Tristen back becuase she is extremely helpful and I know she is going to help me out. I just want to get out of here because being here for a long time makes me worry. Just the unknown gives me anxiety\"       08/20/19 1300   Behavioral Health   Hallucinations denies / not responding to hallucinations   Thinking intact   Orientation person: oriented;place: oriented;date: oriented;time: oriented   Memory baseline memory   Insight insight appropriate to situation   Judgement intact   Eye Contact at examiner   Affect full range affect   Mood mood is calm   Physical Appearance/Attire appears stated age;attire appropriate to age and situation;neat   Hygiene well groomed   1. Wish to be Dead No   2. Non-Specific Active Suicidal Thoughts  No   Self Injury   (denies)   Speech clear;coherent   Psychomotor / Gait balanced;steady   Activities of Daily Living   Hygiene/Grooming independent   Oral Hygiene independent   Dress independent   Laundry with supervision   Room Organization independent     "

## 2019-08-20 NOTE — PROVIDER NOTIFICATION
08/19/19 2200   Behavioral Health   Hallucinations denies / not responding to hallucinations   Thinking intact   Orientation person: oriented;place: oriented;date: oriented   Memory baseline memory   Judgement intact   Eye Contact at examiner   Affect sad;tense;full range affect   Physical Appearance/Attire appears stated age;attire appropriate to age and situation;other (see comment)   Hygiene well groomed   Suicidality other (see comments)  (denies)     Pt. Was active and social in the milieu. Pt. Had a full range affect and was polite upon approach. He complained about being hospitalized for too long and stated that he wants to leave as soon as possible. He reported feeling depressed, sad and anxious. He denied SI/SIB/HI. He denied any hallucinations

## 2019-08-20 NOTE — PROGRESS NOTES
Writer received a voicemail from Kimberley with [facility], yesterday, requesting more progress notes for the patient. [facility] provided her contact information. Writer faxed the requested notes to [facility].

## 2019-08-20 NOTE — PROGRESS NOTES
spoke with the patient who stated that he is frustrated about still being in the hospital. The patient expressed not understanding why he keeps getting denied for treatment facilities as he feels he has been fine on the unit. The patient then stated that he is not sure about going to [facility] as he spoke to a friend who stated that there are dealers near the location. The patient stated that he is open to other referrals, including outside of the city limits.  stated that she would make additional referrals and follow-up with [facility] about the patient's referral.      received a voicemail from Kimberley with [facility]. Kimberley stated that she has questions related to the patient not being accepted into the other treatment facilities and the patient's current mental health state. She provided her contact information and requested a return call.      returned a call to [facility] with [facility].  informed Kimberley that she is unsure where this narrative about the patient not being stable in his mental health is coming from, but the patient has been active on the unit and participates in groups.  stated that there have been instances of agitation, but those are likely related to being in the hospital for two weeks and not being approved for any CD treatment as of yet. Kimberley requested a MAR for the patient, which was subsequently faxed, and stated that she will have her supervisor review the referral and call [facility] back with a decision.       received a return call from [facility] stating that the patient has been approved. The patient's information has been passed on to [facility], [facility], [facility], [facility], and [facility]. Each of these placements have a 1-2 week wait, excluding [facility] and [facility] who have a 4 week wait.  informed the patient of this and he agreed that he is open to [facility] sending out other referrals.  stated that she will do  this and provide the patient with the information for the facilities.

## 2019-08-20 NOTE — PROGRESS NOTES
08/20/19 1400   Occupational Therapy   Type of Intervention structured groups   Response Initiates, socially acceptable   Hours 3       Mental health management group focused on gratitude. Education was provided on the connection between gratitude and quality of life, specifically within various roles and occupations. Pt Response: Reported feeling grateful for the staff and peers who have supported him throughout his hospital stay.  Following, demonstrated good sequencing, following directions, and problem solving during a complex collaborative activity. Offered motivational words and assistance to peers.    Leisure exploration and participation group offered for increased intrinsic motivation to engage in social, non-obligatory occupations via a group game. Structured group was used to promote positive milieu interaction. Pt Response: Full participation with fair sustained attention and acceptance of game rules.    Occupational therapy clinic. Pt Response: Independent - demonstrating consistent performance skills as observed on previous dates.

## 2019-08-21 PROCEDURE — 25000132 ZZH RX MED GY IP 250 OP 250 PS 637: Performed by: EMERGENCY MEDICINE

## 2019-08-21 PROCEDURE — 99232 SBSQ HOSP IP/OBS MODERATE 35: CPT | Performed by: PSYCHIATRY & NEUROLOGY

## 2019-08-21 PROCEDURE — 25000132 ZZH RX MED GY IP 250 OP 250 PS 637: Performed by: PHYSICIAN ASSISTANT

## 2019-08-21 PROCEDURE — 25000132 ZZH RX MED GY IP 250 OP 250 PS 637: Performed by: PSYCHIATRY & NEUROLOGY

## 2019-08-21 PROCEDURE — 12400001 ZZH R&B MH UMMC

## 2019-08-21 PROCEDURE — H2032 ACTIVITY THERAPY, PER 15 MIN: HCPCS

## 2019-08-21 PROCEDURE — G0177 OPPS/PHP; TRAIN & EDUC SERV: HCPCS

## 2019-08-21 RX ADMIN — NALTREXONE HYDROCHLORIDE 50 MG: 50 TABLET, FILM COATED ORAL at 08:03

## 2019-08-21 RX ADMIN — GABAPENTIN 300 MG: 300 CAPSULE ORAL at 08:03

## 2019-08-21 RX ADMIN — GABAPENTIN 300 MG: 300 CAPSULE ORAL at 14:09

## 2019-08-21 RX ADMIN — CLOBETASOL PROPIONATE: 0.5 CREAM TOPICAL at 17:16

## 2019-08-21 RX ADMIN — THIAMINE HCL TAB 100 MG 100 MG: 100 TAB at 08:03

## 2019-08-21 RX ADMIN — FOLIC ACID 1 MG: 1 TABLET ORAL at 08:04

## 2019-08-21 RX ADMIN — MULTIPLE VITAMINS W/ MINERALS TAB 1 TABLET: TAB at 08:03

## 2019-08-21 RX ADMIN — AMLODIPINE BESYLATE 7.5 MG: 2.5 TABLET ORAL at 08:03

## 2019-08-21 RX ADMIN — GABAPENTIN 300 MG: 300 CAPSULE ORAL at 21:26

## 2019-08-21 RX ADMIN — FLUOXETINE 40 MG: 20 CAPSULE ORAL at 08:04

## 2019-08-21 ASSESSMENT — ACTIVITIES OF DAILY LIVING (ADL)
LAUNDRY: WITH SUPERVISION
ORAL_HYGIENE: INDEPENDENT
DRESS: INDEPENDENT
HYGIENE/GROOMING: INDEPENDENT
LAUNDRY: WITH SUPERVISION
ORAL_HYGIENE: INDEPENDENT
HYGIENE/GROOMING: INDEPENDENT
DRESS: INDEPENDENT

## 2019-08-21 NOTE — PROGRESS NOTES
"[facility], [place]   Psychiatric Progress Note        Interim History:     The patient's care was discussed with the treatment team during the daily team meeting and/or staff's chart notes were reviewed.  Staff report patient is more social with peers. He is more open talking about his life. He is very disappointed to hear that number of CD programs refused to take him, but has better insight into his symptoms, agrees to stay at this unit voluntarily. Today he was seen in his bed. He reported feeling tired, however, denied Suicidal ideation, confirmed that he was interested in IRTs in addition to going to CD treatment and agreed to stay at this hospital voluntarily.  He denied having further questions or concerns.     Per CTC's note: \"Writer made additional CD treatment referrals for the patient. Writer sent referrals to [facility], [facility], [facility], and [facility]. Writer will plan to follow up with these referrals tomorrow.\"       Medications:       amLODIPine  7.5 mg Oral Daily     clobetasol   Topical BID     FLUoxetine  40 mg Oral Daily     folic acid  1 mg Oral Daily     gabapentin  300 mg Oral TID     multivitamin w/minerals  1 tablet Oral Daily     naltrexone  50 mg Oral Daily     vitamin B1  100 mg Oral Daily          Allergies:   No Known Allergies       Labs:     No results found for this or any previous visit (from the past 24 hour(s)).       Psychiatric Examination:     BP (!) 147/88   Pulse 68   Temp 97.9  F (36.6  C) (Oral)   Resp 16   Ht 1.905 m (6' 3\")   Wt 109 kg (240 lb 4.8 oz)   SpO2 98%   BMI 30.04 kg/m    Weight is 240 lbs 4.8 oz  Body mass index is 30.04 kg/m .    Orthostatic Vitals       Most Recent      Sitting Orthostatic /88 08/21 0700    Sitting Orthostatic Pulse (bpm) 64 08/21 0700    Standing Orthostatic /85 08/21 0700    Standing Orthostatic Pulse (bpm) 56 08/21 0700         Appearance: dressed in hospital " "scrubs Attitude:  cooperative  Eye Contact:  fair  Mood: \"good\", but more anxious  Affect:  constricted mobility  Speech:  Better, normal speech production  Psychomotor Behavior:  No psychomotor agitation or retardation,   Throught Process:  logical, linear and goal oriented  Associations:  no loose associations  Thought Content:  Denies Visual hallucinations, Suicidal ideation, Homicidal thoughts. Reports above mentioned Auditory hallucinations, See discussion above.    Insight:  partial, but improving   Judgement:  fair  Oriented to:  time, person, and place  Attention Span and Concentration:  fair  Recent and Remote Memory:  fair    Clinical Global Impressions  First:  Considering your total clinical experience with this particular patient population, how severe are the patient's symptoms at this time?: 7 (08/06/19 2120)  Compared to the patient's condition at the START of treatment, this patient's condition is:: 4 (08/06/19 2120)  Most recent:  Considering your total clinical experience with this particular patient population, how severe are the patient's symptoms at this time?:   Compared to the patient's condition at the START of treatment, this patient's condition is: 4/2 8/15/2019          Precautions:     Behavioral Orders   Procedures     Code 1 - Restrict to Unit     Elopement precautions     Routine Programming     As clinically indicated     Seizure precautions     Status 15     Every 15 minutes.          DIagnoses:     Schizoaffective disorder-bipolar type  Alcohol use disorder, severe with withdrawal.       Plan:   No medication changes today. Additional referrals were made by [facility], see CTC's note for details. Will continue to provide support and structure and encourage to stay at this hospital because of very high risk of relapse.      "

## 2019-08-21 NOTE — PROGRESS NOTES
Annr made additional CD treatment referrals for the patient. Annr sent referrals to [facility], [facility], [facility], and [facility]. Annr will plan to follow up with these referrals tomorrow.     Writer spoke with the patient regarding the above information. Writer inquired about the patient's willingness to stay in the hospital at least until the end of the week. The patient stated that he would be willing to stay until then but no longer. He reported feeling like he has already been in the hospital for too long.

## 2019-08-21 NOTE — PLAN OF CARE
Patient was seen out in the milieu, sociable both peers and staff, denies SI/SIB endorses depression and anxiety of 6/10, states is optimistic about future goals, appears brighter and stated mood was lifting since his stay here felt much better today his coping skills include pacing, reading , attending groups hearing different train of thought, his blood pressure continues to stay elevated its currently 148/76, will continue to monitor, no other concerns stable at baseline.

## 2019-08-21 NOTE — PROGRESS NOTES
received a call from Chely with [facility] confirming that they are able to accept the patient. She scheduled the patient for admission on Friday, August 23rd at 8:30am. The patient will need to be cabbed to the location.  informed the patient of this information and he was in agreeance with this plan.      received a voicemail from April with [facility]. April stated that she needs more demographic information for the patient including marital status, how much the patient is receiving for SSDI, and the current wait time for the program. April provided her contact information ([phone]) and requested a return call.      returned a call to April with [facility] ([phone]) and informed her about the patient being accepted into another facility. April stated that she will take the patient off of the list.

## 2019-08-21 NOTE — PROGRESS NOTES
"Pt is visible and active in the milieu. Pt is eager to leave. Pt is willing to wait but doesn't think he'll be willing to stay longer if he van't in somewhere. \"I will check myself out if I don't a placement by next\" pt denies SI, SIB, anxiety and depression are 5.        08/21/19 1229   Behavioral Health   Hallucinations denies / not responding to hallucinations   Thinking intact   Orientation person: oriented;place: oriented;date: oriented;time: oriented   Memory baseline memory   Insight insight appropriate to situation;insight appropriate to events   Judgement intact   Eye Contact at examiner   Affect full range affect   Mood mood is calm   Physical Appearance/Attire appears stated age;attire appropriate to age and situation   Hygiene well groomed   1. Wish to be Dead No   2. Non-Specific Active Suicidal Thoughts  No   Self Injury   (denies)   Elopement Statements about wanting to leave   Activity   (social and visibe in the milieu, going to groups)   Speech clear;coherent   Psychomotor / Gait balanced;steady   Activities of Daily Living   Hygiene/Grooming independent   Oral Hygiene independent   Dress independent   Laundry with supervision   Room Organization independent     "

## 2019-08-21 NOTE — PROGRESS NOTES
Pt slept well this shift. Blood pressure at 0400 = 139/78 ; Denied SI/SIB - No concerns noted at this time. Currently sleeping ; will continue to monitor.

## 2019-08-21 NOTE — PLAN OF CARE
BEHAVIORAL TEAM DISCUSSION    Participants: Tristen Juan (CTC), Preston Estrada (RN)  Progress: Progressing; the patient is active on the unit and participates in unit groups.   Anticipated length of stay: 7-10 days.   Continued Stay Criteria/Rationale: Continued stabilization and admission to chemical dependency treatment.   Medical/Physical: Elevated Blood Pressure.   Precautions:   Behavioral Orders   Procedures     Code 1 - Restrict to Unit     Elopement precautions     Routine Programming     As clinically indicated     Seizure precautions     Status 15     Every 15 minutes.     Plan: [facility] will continue to coordinate with residential CD placements for the patient. Upon admission and stabilization, the patient will be assessed for discharge.     Rationale for change in precautions or plan: None.

## 2019-08-21 NOTE — PROGRESS NOTES
Writer spoke with the patient who stated that he has had more time to process his acceptance to [facility] and would prefer not to go there. The patient stated that he is worried about relapsing being in the city and would prefer to go somewhere that is outside of the city limits or a close suburb. Writer stated that she will plan to call [facility] tomorrow and let them know. Writer also inquired about the patient's willingness to remain hospitalized until he got into a program that meets what he is looking for. The patient stated that he is willing to stay and was in agreeance with the above mentioned plan.

## 2019-08-22 PROCEDURE — 25000132 ZZH RX MED GY IP 250 OP 250 PS 637: Performed by: PHYSICIAN ASSISTANT

## 2019-08-22 PROCEDURE — 99231 SBSQ HOSP IP/OBS SF/LOW 25: CPT | Performed by: PSYCHIATRY & NEUROLOGY

## 2019-08-22 PROCEDURE — 90853 GROUP PSYCHOTHERAPY: CPT

## 2019-08-22 PROCEDURE — 25000132 ZZH RX MED GY IP 250 OP 250 PS 637: Performed by: PSYCHIATRY & NEUROLOGY

## 2019-08-22 PROCEDURE — 25000132 ZZH RX MED GY IP 250 OP 250 PS 637: Performed by: EMERGENCY MEDICINE

## 2019-08-22 PROCEDURE — 12400001 ZZH R&B MH UMMC

## 2019-08-22 RX ADMIN — NALTREXONE HYDROCHLORIDE 50 MG: 50 TABLET, FILM COATED ORAL at 07:55

## 2019-08-22 RX ADMIN — MULTIPLE VITAMINS W/ MINERALS TAB 1 TABLET: TAB at 07:56

## 2019-08-22 RX ADMIN — GABAPENTIN 300 MG: 300 CAPSULE ORAL at 07:56

## 2019-08-22 RX ADMIN — FOLIC ACID 1 MG: 1 TABLET ORAL at 07:56

## 2019-08-22 RX ADMIN — AMLODIPINE BESYLATE 7.5 MG: 2.5 TABLET ORAL at 07:55

## 2019-08-22 RX ADMIN — THIAMINE HCL TAB 100 MG 100 MG: 100 TAB at 07:56

## 2019-08-22 RX ADMIN — GABAPENTIN 300 MG: 300 CAPSULE ORAL at 13:08

## 2019-08-22 RX ADMIN — GABAPENTIN 300 MG: 300 CAPSULE ORAL at 21:07

## 2019-08-22 RX ADMIN — FLUOXETINE 40 MG: 20 CAPSULE ORAL at 07:56

## 2019-08-22 ASSESSMENT — ACTIVITIES OF DAILY LIVING (ADL)
ORAL_HYGIENE: INDEPENDENT
LAUNDRY: WITH SUPERVISION
LAUNDRY: WITH SUPERVISION
HYGIENE/GROOMING: INDEPENDENT
ORAL_HYGIENE: INDEPENDENT
DRESS: INDEPENDENT;SCRUBS (BEHAVIORAL HEALTH)
DRESS: INDEPENDENT
HYGIENE/GROOMING: INDEPENDENT

## 2019-08-22 ASSESSMENT — MIFFLIN-ST. JEOR: SCORE: 2020.2

## 2019-08-22 NOTE — PROGRESS NOTES
Writer spoke with the patient who stated that he is now interested in going to [facility] I[facility] facility. Writer informed the patient that and IRTs facility is not going to offer the CD treatment that the patient has expressed wanting and needing. Writer also stated that IRTs will not be as intensive as MI/CD treatment. Writer inquired about the patient expressing wanting to be outside of the [facility] area because of his use. The patient stated that has been able to speak with individuals who have gone to IRTs and stated that he feels it will be enough to address his CD and mental health. Writer stated that she will speak with the attending about it and place the referral if the patient desires.     Annr completed the referral for [facility] for the patient and will plan to f/u tomorrow.

## 2019-08-22 NOTE — PROGRESS NOTES
"[facility], [place]   Psychiatric Progress Note        Interim History:     The patient's care was discussed with the treatment team during the daily team meeting and/or staff's chart notes were reviewed.  Staff report patient is more social with peers. He is more open talking about his life. He is very disappointed to hear that number of CD programs refused to take him, but has better insight into his symptoms, agrees to stay at this unit voluntarily. Now he wants to go to IRTs program, even, after being explained that main focus in IRTs will be on his mental health, not on his chemical addiction. He was offered to go to [facility], but refused, said: \"everyone drinks and uses there, I need to go to some place outside of the [facility]\". Denies Suicidal ideation.     Per [facility]'s note: \"Writer spoke with the patient who stated that he is now interested in going to [facility] facility. Writer informed the patient that and IRTs facility is not going to offer the CD treatment that the patient has expressed wanting and needing. Writer also stated that IRTs will not be as intensive as MI/CD treatment. Writer inquired about the patient expressing wanting to be outside of the [facility] area because of his use. The patient stated that has been able to speak with individuals who have gone to IRTs and stated that he feels it will be enough to address his CD and mental health. Writer stated that she will speak with the attending about it and place the referral if the patient desires.\"       Medications:       amLODIPine  7.5 mg Oral Daily     clobetasol   Topical BID     FLUoxetine  40 mg Oral Daily     folic acid  1 mg Oral Daily     gabapentin  300 mg Oral TID     multivitamin w/minerals  1 tablet Oral Daily     naltrexone  50 mg Oral Daily     vitamin B1  100 mg Oral Daily          Allergies:   No Known Allergies       Labs:     No results found for this or any previous visit (from the past 24 " "hour(s)).       Psychiatric Examination:     BP (!) 146/81   Pulse 58   Temp 98.2  F (36.8  C) (Oral)   Resp 16   Ht 1.905 m (6' 3\")   Wt 107.5 kg (236 lb 14.4 oz)   SpO2 97%   BMI 29.61 kg/m    Weight is 236 lbs 14.4 oz  Body mass index is 29.61 kg/m .    Orthostatic Vitals       Most Recent      Sitting Orthostatic /81 08/22 0738    Sitting Orthostatic Pulse (bpm) 60 08/22 0738    Standing Orthostatic /81 08/22 0738    Standing Orthostatic Pulse (bpm) 59 08/22 0738         Appearance: dressed in hospital scrubs Attitude:  cooperative  Eye Contact:  fair  Mood: \"good\", less anxious  Affect:  constricted mobility  Speech:  Better, normal speech production  Psychomotor Behavior:  No psychomotor agitation or retardation,   Throught Process:  logical, linear and goal oriented  Associations:  no loose associations  Thought Content:  Denies Visual hallucinations, Suicidal ideation, Homicidal thoughts. Denies Auditory hallucinations, Visual hallucinations , See discussion above.    Insight:  partial, but improving   Judgement:  fair  Oriented to:  time, person, and place  Attention Span and Concentration:  fair  Recent and Remote Memory:  fair    Clinical Global Impressions  First:  Considering your total clinical experience with this particular patient population, how severe are the patient's symptoms at this time?: 7 (08/06/19 2120)  Compared to the patient's condition at the START of treatment, this patient's condition is:: 4 (08/06/19 2120)  Most recent:  Considering your total clinical experience with this particular patient population, how severe are the patient's symptoms at this time?:   Compared to the patient's condition at the START of treatment, this patient's condition is: 3/2 8/22/2019          Precautions:     Behavioral Orders   Procedures     Code 1 - Restrict to Unit     Elopement precautions     Routine Programming     As clinically indicated     Seizure precautions     Status 15     " Every 15 minutes.          DIagnoses:     Schizoaffective disorder-bipolar type  Alcohol use disorder, severe with withdrawal.       Plan:   No medication changes today. Additional referrals were made by [facility] to IRTs, see CTC's note for details. Will continue to provide support and structure and encourage to stay at this hospital because of very high risk of relapse and readmission.

## 2019-08-22 NOTE — PLAN OF CARE
48 hour nursing assessment: Pt evaluation continues. Assessed mood, anxiety, thoughts and behavior. Is progressing toward goals. Encourage participation in groups and developing healthy coping skills. Will continue current plan of care. Pt has been in/out of the milieu this shift and has been socializing with peers and staff. Pt has been calm, cooperative, and pleasant and is currently awaiting placement. Pt states that he is open to MI/CD treatment. Pt denies SI/SIB or hallucinations.

## 2019-08-22 NOTE — PROGRESS NOTES
"   08/22/19 1500   Occupational Therapy   Type of Intervention structured groups   Response Initiates, socially acceptable   Hours 3     Attended 3/3 hrs of occupational therapy groups offered this date. Overall congruent-bright affect and full engagement.      Occupation-based coping skill exploration group through movement to facilitate relaxation and stress management via chair yoga. Education was provided on the use of yoga practice as a coping tool within daily/weekly routine. Pt Response: Reported no previous experience with yoga however remained open to this new experience. Independently followed and engaged in all of the yoga poses. Verbalized feeling \"good\" at the end of group and somewhat proud that he pushed himself outside of his comfort zone.     Occupational therapy clinic. Pt Response: Demonstrated consistent performance skills as observed on previous dates.     "

## 2019-08-22 NOTE — PROGRESS NOTES
"Writer spoke with the patient to confirm that he is not wanting to go to [facility] or [facility]. Writer informed the patient that she has received a voicemail from [facility] and they have a bed available and it is in the same city as the [facility] that the patient expressed interest in. The patient stated that he is still wanting to decline both [facility] and [facility]. The patient stated that both of those locations are near his \"old stomping grounds.\" The patient stated that he is still wanting to go to IRTs or a [facility] program outside of the city.   "

## 2019-08-22 NOTE — PROGRESS NOTES
Patient reports moderate depression and anxiety at a manageable level, denies SI/SIB. Patient states he is somewhat bored and frustrated at the length of his stay, but wants to find a good treatment program before leaving. Patient was visible and social with full range affect during the shift.     08/21/19 2100   Behavioral Health   Hallucinations denies / not responding to hallucinations   Thinking intact   Orientation person: oriented;place: oriented;date: oriented;time: oriented   Memory baseline memory   Insight admits / accepts   Judgement intact   Eye Contact at examiner   Affect full range affect   Mood mood is calm   Physical Appearance/Attire appears stated age;attire appropriate to age and situation   Hygiene well groomed   Suicidality other (see comments)  (Denies)   1. Wish to be Dead No   2. Non-Specific Active Suicidal Thoughts  No   Self Injury other (see comment)  (Denies)   Activity other (see comment)  (Visible and social.)   Speech clear;coherent   Medication Sensitivity no stated side effects;no observed side effects   Psychomotor / Gait balanced;steady   Activities of Daily Living   Hygiene/Grooming independent   Oral Hygiene independent   Dress independent   Laundry with supervision   Room Organization independent

## 2019-08-23 PROCEDURE — 25000132 ZZH RX MED GY IP 250 OP 250 PS 637: Performed by: EMERGENCY MEDICINE

## 2019-08-23 PROCEDURE — 25000132 ZZH RX MED GY IP 250 OP 250 PS 637: Performed by: PHYSICIAN ASSISTANT

## 2019-08-23 PROCEDURE — G0177 OPPS/PHP; TRAIN & EDUC SERV: HCPCS

## 2019-08-23 PROCEDURE — 99231 SBSQ HOSP IP/OBS SF/LOW 25: CPT | Performed by: PSYCHIATRY & NEUROLOGY

## 2019-08-23 PROCEDURE — 25000132 ZZH RX MED GY IP 250 OP 250 PS 637: Performed by: PSYCHIATRY & NEUROLOGY

## 2019-08-23 PROCEDURE — 12400001 ZZH R&B MH UMMC

## 2019-08-23 RX ADMIN — FLUOXETINE 40 MG: 20 CAPSULE ORAL at 09:09

## 2019-08-23 RX ADMIN — GABAPENTIN 300 MG: 300 CAPSULE ORAL at 22:34

## 2019-08-23 RX ADMIN — GABAPENTIN 300 MG: 300 CAPSULE ORAL at 09:09

## 2019-08-23 RX ADMIN — MULTIPLE VITAMINS W/ MINERALS TAB 1 TABLET: TAB at 09:09

## 2019-08-23 RX ADMIN — THIAMINE HCL TAB 100 MG 100 MG: 100 TAB at 09:09

## 2019-08-23 RX ADMIN — AMLODIPINE BESYLATE 7.5 MG: 2.5 TABLET ORAL at 09:09

## 2019-08-23 RX ADMIN — FOLIC ACID 1 MG: 1 TABLET ORAL at 09:09

## 2019-08-23 RX ADMIN — GABAPENTIN 300 MG: 300 CAPSULE ORAL at 14:42

## 2019-08-23 RX ADMIN — NALTREXONE HYDROCHLORIDE 50 MG: 50 TABLET, FILM COATED ORAL at 09:09

## 2019-08-23 ASSESSMENT — ACTIVITIES OF DAILY LIVING (ADL)
LAUNDRY: WITH SUPERVISION
DRESS: INDEPENDENT
HYGIENE/GROOMING: INDEPENDENT
ORAL_HYGIENE: INDEPENDENT

## 2019-08-23 NOTE — PROGRESS NOTES
Writer called [facility] to inquire about the status of the patient's referral. Writer spoke with Emerson in admissions who stated that they received the patient's referral but have not yet reviewed it. Emerson stated that they do have a few planned discharges for the beginning of September and stated that the coordinator will be in touch following review of the referral and wether they would like to move forward. Writer will inform the patient.

## 2019-08-23 NOTE — PROGRESS NOTES
"The pt is more hopeful than on previous check-ins. He was able to have insight enough on 2 other facilities where he felt \"sure that I would just use again if I was some place I know.\" He seems more willing to wait for an open bed now, that he knows the process is moving forward, and he feels \"part of the decision.\"  He showered, ate well, and attended some of the groups offered. He is pleasant and appropriate in the milieu. He denies SI/SIB and hallucinations.     08/23/19 1342   Behavioral Health   Hallucinations denies / not responding to hallucinations   Thinking intact   Orientation person: oriented;place: oriented;date: oriented;time: oriented   Memory baseline memory   Insight insight appropriate to situation   Judgement intact  (selectively)   Eye Contact at examiner   Affect full range affect   Mood mood is calm   Physical Appearance/Attire other (see comment)   Hygiene well groomed;other (see comment)   Suicidality other (see comments)  (pt denied)   1. Wish to be Dead No   2. Non-Specific Active Suicidal Thoughts  No   Self Injury other (see comment)  (pt denies)   Elopement   (no statements)   Activity other (see comment)  (visible at times, selectively social)   Speech clear;coherent   Medication Sensitivity no stated side effects;no observed side effects   Psychomotor / Gait paces;balanced;steady     "

## 2019-08-23 NOTE — PROGRESS NOTES
"[facility], [place]   Psychiatric Progress Note        Interim History:     The patient's care was discussed with the treatment team during the daily team meeting and/or staff's chart notes were reviewed.  Staff report patient is more social with peers. He is more open talking about his life. He appears to be less impulsive and more insightful into his symptoms. More hopeful for the future. Tolerates meds well. Denies Auditory hallucinations, Suicidal ideation.        Medications:       amLODIPine  7.5 mg Oral Daily     clobetasol   Topical BID     FLUoxetine  40 mg Oral Daily     folic acid  1 mg Oral Daily     gabapentin  300 mg Oral TID     multivitamin w/minerals  1 tablet Oral Daily     naltrexone  50 mg Oral Daily     vitamin B1  100 mg Oral Daily          Allergies:   No Known Allergies       Labs:     No results found for this or any previous visit (from the past 24 hour(s)).       Psychiatric Examination:     BP (!) 159/76   Pulse 51   Temp 98.4  F (36.9  C) (Oral)   Resp 15   Ht 1.905 m (6' 3\")   Wt 107.5 kg (236 lb 14.4 oz)   SpO2 97%   BMI 29.61 kg/m    Weight is 236 lbs 14.4 oz  Body mass index is 29.61 kg/m .    Orthostatic Vitals       Most Recent      Sitting Orthostatic /81 08/22 0738    Sitting Orthostatic Pulse (bpm) 60 08/22 0738    Standing Orthostatic /81 08/22 0738    Standing Orthostatic Pulse (bpm) 59 08/22 0738         Appearance: dressed in hospital scrubs Attitude:  cooperative  Eye Contact:  fair  Mood: \"good\", less anxious  Affect:  constricted mobility  Speech:  Better, normal speech production  Psychomotor Behavior:  No psychomotor agitation or retardation,   Throught Process:  logical, linear and goal oriented  Associations:  no loose associations  Thought Content:  Denies Visual hallucinations, Suicidal ideation, Homicidal thoughts. Denies Auditory hallucinations, Visual hallucinations , See discussion above.    Insight:  partial, but " improving   Judgement:  fair  Oriented to:  time, person, and place  Attention Span and Concentration:  fair  Recent and Remote Memory:  fair    Clinical Global Impressions  First:  Considering your total clinical experience with this particular patient population, how severe are the patient's symptoms at this time?: 7 (08/06/19 2120)  Compared to the patient's condition at the START of treatment, this patient's condition is:: 4 (08/06/19 2120)  Most recent:  Considering your total clinical experience with this particular patient population, how severe are the patient's symptoms at this time?:   Compared to the patient's condition at the START of treatment, this patient's condition is: 3/2 8/22/2019          Precautions:     Behavioral Orders   Procedures     Code 1 - Restrict to Unit     Elopement precautions     Routine Programming     As clinically indicated     Seizure precautions     Status 15     Every 15 minutes.          DIagnoses:     Schizoaffective disorder-bipolar type  Alcohol use disorder, severe with withdrawal.       Plan:   No medication changes today. Additional referrals were made by [facility] to IRTs, see CTC's note for details. Will continue to provide support and structure and encourage to stay at this hospital because of very high risk of relapse and readmission.

## 2019-08-23 NOTE — PROGRESS NOTES
"   08/22/19 2000   Group Therapy Session   Group Attendance attended group session   Total Time (minutes) 45   Group Type psychotherapeutic   Group Topic Covered emotions/expression   Patient Participation/Contribution cooperative with task;discussed personal experience with topic;listened actively;offered helpful suggestions to peers   Psychotherapy group goal:  Identify and process feelings in-the-moment utilizing a \"feelings heart\" activity.   N[place] presented as sad and frustrated. He actively participated in the activity and shared his feelings with the group. He reported that it was hard for him to allow himself to be \"vulnerable\" and share. The group commended him for doing so.  He also reported feeling \"lonely\" and \"proud (for sharing).\"   "

## 2019-08-23 NOTE — PROGRESS NOTES
Writer called [facility] and [facility] to inquire about the wait time for admission. Writer left a voicemail for the programs and requested a return call.

## 2019-08-23 NOTE — PROGRESS NOTES
08/23/19 1500   Occupational Therapy   Type of Intervention structured groups   Response Initiates, socially acceptable   Hours 2     Collaborative multi-step hands-on meal preparation group. 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 Pt Response: Bright affect. Demonstrated good process, performance, and collaborative social interaction skills, specifically helping others and organizing material.

## 2019-08-23 NOTE — PROGRESS NOTES
Patient was visible and social in the milieu. He presented with full range affect and a calm mood. Patient denies SI/SIB and hallucination.        08/22/19 2000   Behavioral Health   Hallucinations denies / not responding to hallucinations   Thinking intact   Orientation place: oriented;person: oriented;date: oriented;time: oriented   Memory baseline memory   Insight insight appropriate to situation;insight appropriate to events   Judgement impaired   Eye Contact at examiner   Affect full range affect   Mood mood is calm   Physical Appearance/Attire attire appropriate to age and situation   Hygiene well groomed   Suicidality other (see comments)  (Denies)   1. Wish to be Dead No   2. Non-Specific Active Suicidal Thoughts  No   Activities of Daily Living   Hygiene/Grooming independent   Oral Hygiene independent   Dress independent   Laundry with supervision   Room Organization independent

## 2019-08-24 PROCEDURE — 25000132 ZZH RX MED GY IP 250 OP 250 PS 637: Performed by: EMERGENCY MEDICINE

## 2019-08-24 PROCEDURE — 12400001 ZZH R&B MH UMMC

## 2019-08-24 PROCEDURE — 25000132 ZZH RX MED GY IP 250 OP 250 PS 637: Performed by: PSYCHIATRY & NEUROLOGY

## 2019-08-24 PROCEDURE — 25000132 ZZH RX MED GY IP 250 OP 250 PS 637: Performed by: PHYSICIAN ASSISTANT

## 2019-08-24 RX ADMIN — NALTREXONE HYDROCHLORIDE 50 MG: 50 TABLET, FILM COATED ORAL at 08:05

## 2019-08-24 RX ADMIN — MULTIPLE VITAMINS W/ MINERALS TAB 1 TABLET: TAB at 08:05

## 2019-08-24 RX ADMIN — GABAPENTIN 300 MG: 300 CAPSULE ORAL at 13:57

## 2019-08-24 RX ADMIN — GABAPENTIN 300 MG: 300 CAPSULE ORAL at 21:16

## 2019-08-24 RX ADMIN — FLUOXETINE 40 MG: 20 CAPSULE ORAL at 08:05

## 2019-08-24 RX ADMIN — GABAPENTIN 300 MG: 300 CAPSULE ORAL at 08:04

## 2019-08-24 RX ADMIN — FOLIC ACID 1 MG: 1 TABLET ORAL at 08:05

## 2019-08-24 RX ADMIN — THIAMINE HCL TAB 100 MG 100 MG: 100 TAB at 08:04

## 2019-08-24 RX ADMIN — AMLODIPINE BESYLATE 7.5 MG: 2.5 TABLET ORAL at 08:04

## 2019-08-24 ASSESSMENT — ACTIVITIES OF DAILY LIVING (ADL)
HYGIENE/GROOMING: INDEPENDENT
HYGIENE/GROOMING: INDEPENDENT
ORAL_HYGIENE: INDEPENDENT
DRESS: INDEPENDENT
DRESS: INDEPENDENT
LAUNDRY: WITH SUPERVISION
LAUNDRY: WITH SUPERVISION
ORAL_HYGIENE: INDEPENDENT

## 2019-08-24 NOTE — PROGRESS NOTES
Pt appears calm and stable. Pt denies SI, SIB and anxiety 6 depression 4.         08/24/19 1405   Behavioral Health   Hallucinations denies / not responding to hallucinations   Thinking poor concentration   Orientation person: oriented;place: oriented;date: oriented;time: oriented   Memory baseline memory   Insight poor   Judgement intact   Eye Contact at examiner   Affect full range affect   Mood mood is calm   Hygiene well groomed   1. Wish to be Dead No   2. Non-Specific Active Suicidal Thoughts  No   Self Injury   (denies)   Activity   (social)   Speech clear;coherent   Psychomotor / Gait balanced;steady   Activities of Daily Living   Hygiene/Grooming independent   Oral Hygiene independent   Dress independent   Laundry with supervision   Room Organization independent

## 2019-08-24 NOTE — PROGRESS NOTES
received a voicemail from June with [facility]. She reported that she did receive the referral for the patient and currently has an opening. She stated that she will also have more openings next week. June provided her contact information and requested a return call.      returned a call to June ([phone] ext [phone]).  informed her that the patient is ready for discharge when she has an opening.  requested a return call.

## 2019-08-24 NOTE — PROGRESS NOTES
"Pt denies SI/SIB and hallucinations. Pt states he's feeling \"pretty good\". Pt has a bright affect and presents with humor. Pt played board games with peers and watched tv. Pt states he is grateful to be able to have the opportunity to find the right treatment.           08/23/19 2200   Behavioral Health   Hallucinations denies / not responding to hallucinations   Thinking intact   Orientation person: oriented;place: oriented;date: oriented;time: oriented   Memory baseline memory   Insight insight appropriate to situation   Judgement intact   Eye Contact at examiner   Affect full range affect   Mood mood is calm   Physical Appearance/Attire other (see comment)  (adequate)   Hygiene well groomed   Suicidality other (see comments)  (denies)   1. Wish to be Dead No   2. Non-Specific Active Suicidal Thoughts  No   Self Injury other (see comment)  (denies)   Elopement   (none observed)   Activity other (see comment)  (social in milieu)   Speech clear;coherent   Medication Sensitivity no stated side effects   Psychomotor / Gait balanced;steady   Activities of Daily Living   Hygiene/Grooming independent   Oral Hygiene independent   Dress independent   Laundry with supervision   Room Organization independent     "

## 2019-08-25 PROCEDURE — 25000132 ZZH RX MED GY IP 250 OP 250 PS 637: Performed by: EMERGENCY MEDICINE

## 2019-08-25 PROCEDURE — 12400001 ZZH R&B MH UMMC

## 2019-08-25 PROCEDURE — 25000132 ZZH RX MED GY IP 250 OP 250 PS 637: Performed by: PSYCHIATRY & NEUROLOGY

## 2019-08-25 PROCEDURE — 90853 GROUP PSYCHOTHERAPY: CPT

## 2019-08-25 PROCEDURE — 25000132 ZZH RX MED GY IP 250 OP 250 PS 637: Performed by: PHYSICIAN ASSISTANT

## 2019-08-25 RX ADMIN — NICOTINE POLACRILEX 4 MG: 4 GUM, CHEWING ORAL at 14:12

## 2019-08-25 RX ADMIN — NALTREXONE HYDROCHLORIDE 50 MG: 50 TABLET, FILM COATED ORAL at 09:00

## 2019-08-25 RX ADMIN — NICOTINE POLACRILEX 4 MG: 4 GUM, CHEWING ORAL at 20:02

## 2019-08-25 RX ADMIN — MULTIPLE VITAMINS W/ MINERALS TAB 1 TABLET: TAB at 09:01

## 2019-08-25 RX ADMIN — GABAPENTIN 300 MG: 300 CAPSULE ORAL at 20:02

## 2019-08-25 RX ADMIN — FLUOXETINE 40 MG: 20 CAPSULE ORAL at 09:01

## 2019-08-25 RX ADMIN — FOLIC ACID 1 MG: 1 TABLET ORAL at 09:01

## 2019-08-25 RX ADMIN — AMLODIPINE BESYLATE 7.5 MG: 2.5 TABLET ORAL at 09:01

## 2019-08-25 RX ADMIN — THIAMINE HCL TAB 100 MG 100 MG: 100 TAB at 09:01

## 2019-08-25 RX ADMIN — GABAPENTIN 300 MG: 300 CAPSULE ORAL at 14:12

## 2019-08-25 RX ADMIN — GABAPENTIN 300 MG: 300 CAPSULE ORAL at 09:01

## 2019-08-25 ASSESSMENT — ACTIVITIES OF DAILY LIVING (ADL)
HYGIENE/GROOMING: INDEPENDENT
DRESS: SCRUBS (BEHAVIORAL HEALTH)
LAUNDRY: WITH SUPERVISION
ORAL_HYGIENE: INDEPENDENT

## 2019-08-25 ASSESSMENT — MIFFLIN-ST. JEOR: SCORE: 2033.81

## 2019-08-25 NOTE — PROGRESS NOTES
08/24/19 2006   Art Therapy   Type of Intervention structured groups   Response Participated with encouragement   Hours 1   Treatment Detail    (Art Therapy) - Ana/ meaning activity       Problem- Schizoaffective disorder-bipolar type  Alcohol use disorder, severe             Goal- process, express, cope and regulate feelings and emotions through Art Therapy directives.     Outcome- Pt. participated in group. He was engaged and was thoughtful in the activity and conversation. He said he was glad there was a group as there hadn't been during the day and he was pleased there was something to do. Pt was cooperative and pleasant. He said he was anxious for Monday to learn more about his placement.

## 2019-08-25 NOTE — PLAN OF CARE
"Patient reported feeling better. Rated depression and anxiety as \"low\". Denied suicidal ideations or wishing to be dead; denied Hi hallucinations, or delusions/paranoia. He said he is waiting for placement, hopes for [facility].     Patient's goal for the day was \"to stay awake, so I can sleep at night\".    Thoughts were concrete with improved insights of his mental illness and need to take medications after discharge. Speech was clear and fluent. Mood was calm and appropriate to situation. Patient was social and out on the unit most of the shift. No need for PRN medications. He denied pain and all other physical issues.     No behavioral problems exhibited or anticipated. Denied concerns.   "

## 2019-08-25 NOTE — PROGRESS NOTES
Pt attended groups this shift and he reported that he is depressed. He was napping in his room and waiting for CD placement. He denies suicidal ideations, hallucinations or urges to use at moment. He appears cooperative and pleasant on approach.     08/25/19 1029   Behavioral Health   Hallucinations denies / not responding to hallucinations   Thinking distractable   Orientation time: oriented;date: oriented;place: oriented;person: oriented   Memory baseline memory   Insight poor   Judgement impaired   Eye Contact at examiner   Affect full range affect   Mood mood is calm;depressed   Physical Appearance/Attire appears stated age   Hygiene well groomed   Suicidality other (see comments)  (Denies)   1. Wish to be Dead No   2. Non-Specific Active Suicidal Thoughts  No   Self Injury other (see comment)  (Denies)   Speech coherent;clear   Medication Sensitivity no observed side effects   Psychomotor / Gait steady;balanced   Psycho Education   Type of Intervention 1:1 intervention   Response participates, initiates socially appropriate   Hours 0.5   Activities of Daily Living   Hygiene/Grooming independent   Oral Hygiene independent   Dress scrubs (behavioral health)   Laundry with supervision   Room Organization independent   Activity   Activity Assistance Provided independent

## 2019-08-26 PROCEDURE — 99231 SBSQ HOSP IP/OBS SF/LOW 25: CPT | Performed by: PSYCHIATRY & NEUROLOGY

## 2019-08-26 PROCEDURE — 12400001 ZZH R&B MH UMMC

## 2019-08-26 PROCEDURE — 25000132 ZZH RX MED GY IP 250 OP 250 PS 637: Performed by: EMERGENCY MEDICINE

## 2019-08-26 PROCEDURE — 25000132 ZZH RX MED GY IP 250 OP 250 PS 637: Performed by: PSYCHIATRY & NEUROLOGY

## 2019-08-26 PROCEDURE — 25000132 ZZH RX MED GY IP 250 OP 250 PS 637: Performed by: PHYSICIAN ASSISTANT

## 2019-08-26 PROCEDURE — G0177 OPPS/PHP; TRAIN & EDUC SERV: HCPCS

## 2019-08-26 RX ORDER — CLOBETASOL PROPIONATE 0.5 MG/G
CREAM TOPICAL 2 TIMES DAILY
Qty: 15 G | Refills: 1 | Status: SHIPPED | OUTPATIENT
Start: 2019-08-26

## 2019-08-26 RX ORDER — POLYETHYLENE GLYCOL 3350 17 G
2 POWDER IN PACKET (EA) ORAL
Qty: 200 LOZENGE | Refills: 1 | Status: SHIPPED | OUTPATIENT
Start: 2019-08-26

## 2019-08-26 RX ORDER — GABAPENTIN 300 MG/1
300 CAPSULE ORAL 3 TIMES DAILY
Qty: 90 CAPSULE | Refills: 1 | Status: SHIPPED | OUTPATIENT
Start: 2019-08-26

## 2019-08-26 RX ORDER — AMLODIPINE BESYLATE 2.5 MG/1
7.5 TABLET ORAL DAILY
Qty: 90 TABLET | Refills: 1 | Status: SHIPPED | OUTPATIENT
Start: 2019-08-27

## 2019-08-26 RX ORDER — FLUOXETINE 40 MG/1
40 CAPSULE ORAL DAILY
Qty: 30 CAPSULE | Refills: 1 | Status: SHIPPED | OUTPATIENT
Start: 2019-08-27

## 2019-08-26 RX ORDER — TRAZODONE HYDROCHLORIDE 50 MG/1
50 TABLET, FILM COATED ORAL
Qty: 30 TABLET | Refills: 1 | Status: SHIPPED | OUTPATIENT
Start: 2019-08-26

## 2019-08-26 RX ORDER — CLONIDINE HYDROCHLORIDE 0.1 MG/1
0.1 TABLET ORAL EVERY 4 HOURS PRN
Qty: 90 TABLET | Refills: 0 | Status: SHIPPED | OUTPATIENT
Start: 2019-08-26

## 2019-08-26 RX ORDER — HYDROXYZINE HYDROCHLORIDE 50 MG/1
50 TABLET, FILM COATED ORAL EVERY 4 HOURS PRN
Qty: 30 TABLET | Refills: 1 | Status: SHIPPED | OUTPATIENT
Start: 2019-08-26

## 2019-08-26 RX ORDER — NALTREXONE HYDROCHLORIDE 50 MG/1
50 TABLET, FILM COATED ORAL DAILY
Qty: 30 TABLET | Refills: 1 | Status: SHIPPED | OUTPATIENT
Start: 2019-08-27

## 2019-08-26 RX ORDER — POLYETHYLENE GLYCOL 3350 17 G
2 POWDER IN PACKET (EA) ORAL
Status: DISCONTINUED | OUTPATIENT
Start: 2019-08-26 | End: 2019-08-27 | Stop reason: HOSPADM

## 2019-08-26 RX ORDER — MULTIPLE VITAMINS W/ MINERALS TAB 9MG-400MCG
1 TAB ORAL DAILY
Qty: 30 TABLET | Refills: 1 | Status: SHIPPED | OUTPATIENT
Start: 2019-08-27

## 2019-08-26 RX ADMIN — MULTIPLE VITAMINS W/ MINERALS TAB 1 TABLET: TAB at 08:17

## 2019-08-26 RX ADMIN — GABAPENTIN 300 MG: 300 CAPSULE ORAL at 20:32

## 2019-08-26 RX ADMIN — NICOTINE POLACRILEX 2 MG: 2 LOZENGE ORAL at 20:32

## 2019-08-26 RX ADMIN — FLUOXETINE 40 MG: 20 CAPSULE ORAL at 08:17

## 2019-08-26 RX ADMIN — THIAMINE HCL TAB 100 MG 100 MG: 100 TAB at 08:17

## 2019-08-26 RX ADMIN — AMLODIPINE BESYLATE 7.5 MG: 2.5 TABLET ORAL at 08:17

## 2019-08-26 RX ADMIN — NALTREXONE HYDROCHLORIDE 50 MG: 50 TABLET, FILM COATED ORAL at 08:17

## 2019-08-26 RX ADMIN — GABAPENTIN 300 MG: 300 CAPSULE ORAL at 08:17

## 2019-08-26 RX ADMIN — NICOTINE POLACRILEX 2 MG: 2 LOZENGE ORAL at 14:27

## 2019-08-26 RX ADMIN — GABAPENTIN 300 MG: 300 CAPSULE ORAL at 14:27

## 2019-08-26 RX ADMIN — FOLIC ACID 1 MG: 1 TABLET ORAL at 08:17

## 2019-08-26 NOTE — PROGRESS NOTES
The pt is social, and excited to hear that he has been accepted and is anxiously waiting for the paperwork to be done. The tentative plan is for discharge tomorrow at 10.  He continues to deny SI/SIB urges and hallucinations. He still has pressured speech, which is baseline. He is calm and appropriate in the milieu, and pleasant upon approach. He denies SI/SIB urges and hallucinations. He is eating well and attends to his ADls without prompting.     08/26/19 1400   Behavioral Health   Hallucinations denies / not responding to hallucinations   Thinking intact   Orientation person: oriented;date: oriented;place: oriented;time: oriented   Memory baseline memory   Insight insight appropriate to situation   Judgement intact   Eye Contact at examiner   Affect full range affect   Mood mood is calm   Physical Appearance/Attire attire appropriate to age and situation   Hygiene well groomed   Suicidality other (see comments)  (pt denied)   1. Wish to be Dead No   2. Non-Specific Active Suicidal Thoughts  No   Self Injury other (see comment)  (pt denies)   Elopement   (nothing to report)   Activity other (see comment)  (attending all groups, social)   Speech coherent;clear   Medication Sensitivity no observed side effects;no stated side effects   Psychomotor / Gait balanced;steady

## 2019-08-26 NOTE — PROGRESS NOTES
spoke with June, the admissions coordinator. She emailed the admission paperwork to  that the attending completed. June stated that they are able to admit the patient tomorrow at 10am as longs as they receive the completed admission paperwork.  emailed the completed admission paperwork to June. The patient has a tentative discharge for tomorrow, August 27th at 9:45am. The patient will need to be cabbed by the hospital.      called June with [facility] to ensure that she received the admission paperwork for the patient. She needs one more form signed by the provider but stated that other than that, the patient is good to go.

## 2019-08-26 NOTE — PROGRESS NOTES
called June with [facility] ([phone] ext [phone]) and left a voicemail with an inquiry about the patient's referral.  requested a return call.      received a return call from June with [facility] in [facility]. June stated that she is able to interview the patient today at 10am. June stated that they have one available bed right now and will have two next week.  provided the contact information for the unit for June to reach the patient. The patient has been informed.      received a voicemail from Anna with [facility]. Anna stated that they are unable to accept the patient at their facility due to his diagnosis of Schizoaffective Disorder.      received a voicemail from June with [facility] who stated that they are moving forward with the patient's referral. June requested the best way to send the necessary paperwork to be completed for the patient's admission. June provided her contact information and requested a return call .      returned a call to June with [facility].  left a voicemail providing both the fax number and her email to receive the admission paperwork for the patient.  requested a return call to coordinate an admission date for the patient.

## 2019-08-26 NOTE — PLAN OF CARE
Problem: OT General Care Plan  Goal: OT Frequency  Description  Pt will practice using >2 coping strategies to manage stress and reduce symptoms to demonstrate increased readiness for discharge.     Attended 2/3 hrs of occupational therapy groups offered this date. Overall congruent affect and full engagement.    Occupational therapy clinic. Pt Response: Demonstrated consistent performance skills as observed on previous dates.   Meditative creative expression task for coping skill exploration, specifically calming, concentration, and resilience. Pt Response: Appropriate within group expectations. Appeared to achieve a relaxed state during this activity. Demonstrated good attention as Pt included moderate detail to task.     Outcome: Improving

## 2019-08-26 NOTE — PROGRESS NOTES
"[facility] [place]   Psychiatric Progress Note        Interim History:     The patient's care was discussed with the treatment team during the daily team meeting and/or staff's chart notes were reviewed.  Staff report patient is more social with peers. He is more open talking about his life. He appears to be less impulsive and more insightful into his symptoms. More hopeful for the future. Tolerates meds well. Denies Auditory hallucinations, Suicidal ideation. Today had phone interview with [facility] and sounded hopeful that they would take him sometime this week. He sounded more upbeat after hearing this news.       Medications:       amLODIPine  7.5 mg Oral Daily     clobetasol   Topical BID     FLUoxetine  40 mg Oral Daily     folic acid  1 mg Oral Daily     gabapentin  300 mg Oral TID     multivitamin w/minerals  1 tablet Oral Daily     naltrexone  50 mg Oral Daily     vitamin B1  100 mg Oral Daily          Allergies:   No Known Allergies       Labs:     No results found for this or any previous visit (from the past 24 hour(s)).       Psychiatric Examination:     BP (!) 141/84   Pulse 57   Temp 97.9  F (36.6  C) (Tympanic)   Resp 16   Ht 1.905 m (6' 3\")   Wt 108.8 kg (239 lb 14.4 oz)   SpO2 97%   BMI 29.99 kg/m    Weight is 239 lbs 14.4 oz  Body mass index is 29.99 kg/m .    Orthostatic Vitals       Most Recent      Sitting Orthostatic /84 08/26 0700    Sitting Orthostatic Pulse (bpm) 65 08/26 0700    Standing Orthostatic /86 08/26 0700    Standing Orthostatic Pulse (bpm) 107 08/26 0700         Appearance: dressed in hospital scrubs Attitude:  cooperative  Eye Contact:  fair  Mood: \"good\", less anxious  Affect:  constricted mobility  Speech:  Better, normal speech production  Psychomotor Behavior:  No psychomotor agitation or retardation,   Throught Process:  logical, linear and goal oriented  Associations:  no loose associations  Thought Content:  " Denies Visual hallucinations, Suicidal ideation, Homicidal thoughts. Denies Auditory hallucinations, Visual hallucinations , See discussion above.    Insight:  partial, but improving   Judgement:  fair  Oriented to:  time, person, and place  Attention Span and Concentration:  fair  Recent and Remote Memory:  fair    Clinical Global Impressions  First:  Considering your total clinical experience with this particular patient population, how severe are the patient's symptoms at this time?: 7 (08/06/19 2120)  Compared to the patient's condition at the START of treatment, this patient's condition is:: 4 (08/06/19 2120)  Most recent:  Considering your total clinical experience with this particular patient population, how severe are the patient's symptoms at this time?:   Compared to the patient's condition at the START of treatment, this patient's condition is: 3/2 8/22/2019          Precautions:     Behavioral Orders   Procedures     Code 1 - Restrict to Unit     Elopement precautions     Routine Programming     As clinically indicated     Seizure precautions     Status 15     Every 15 minutes.          DIagnoses:     Schizoaffective disorder-bipolar type  Alcohol use disorder, severe with withdrawal.       Plan:   Will, per patient's request, replace his nicotine gum with lozenges. No other med changes. Will, likely, be [facility] this [facility]. Will continue to provide support and structure and encourage to stay at this hospital because of very high risk of relapse and readmission.

## 2019-08-26 NOTE — PLAN OF CARE
Patient was visible in the milieu sociable with both staff and peers denied SI/SIB and hallucinations still continues to endorse anxiety of 6/10 and depression of 4/10, medication compliant, ate dinner well, appeared calm presented with full range affect, is anxious about going to treatment at the same time looking forward to discharging, no other issues noted currently stable at baseline.

## 2019-08-26 NOTE — PROGRESS NOTES
08/25/19 2000   Group Therapy Session   Group Attendance attended group session   Total Time (minutes) 45   Group Type psychotherapeutic   Group Topic Covered other (see comments)   Patient Participation/Contribution cooperative with task;discussed personal experience with topic;listened actively   Patient participated in psychotherapy group which included a mindfulness activity focusing on the 5 senses and then processing as a group.    Neville presented as tired and reported frustration at still being in the hospital. He also shared some insight into feeling thankful for having the opportunity to stabilize in the hospital and get back on track once he leaves. Neville was actively engaged in the activity and processing as a group.

## 2019-08-27 VITALS
BODY MASS INDEX: 29.94 KG/M2 | WEIGHT: 240.8 LBS | RESPIRATION RATE: 16 BRPM | HEIGHT: 75 IN | HEART RATE: 64 BPM | SYSTOLIC BLOOD PRESSURE: 137 MMHG | TEMPERATURE: 98.4 F | DIASTOLIC BLOOD PRESSURE: 73 MMHG | OXYGEN SATURATION: 90 %

## 2019-08-27 PROCEDURE — 25000132 ZZH RX MED GY IP 250 OP 250 PS 637: Performed by: PHYSICIAN ASSISTANT

## 2019-08-27 PROCEDURE — 25000132 ZZH RX MED GY IP 250 OP 250 PS 637: Performed by: PSYCHIATRY & NEUROLOGY

## 2019-08-27 PROCEDURE — 99239 HOSP IP/OBS DSCHRG MGMT >30: CPT | Performed by: PSYCHIATRY & NEUROLOGY

## 2019-08-27 PROCEDURE — 25000132 ZZH RX MED GY IP 250 OP 250 PS 637: Performed by: EMERGENCY MEDICINE

## 2019-08-27 RX ADMIN — FOLIC ACID 1 MG: 1 TABLET ORAL at 07:57

## 2019-08-27 RX ADMIN — AMLODIPINE BESYLATE 7.5 MG: 2.5 TABLET ORAL at 07:57

## 2019-08-27 RX ADMIN — FLUOXETINE 40 MG: 20 CAPSULE ORAL at 07:57

## 2019-08-27 RX ADMIN — THIAMINE HCL TAB 100 MG 100 MG: 100 TAB at 07:57

## 2019-08-27 RX ADMIN — MULTIPLE VITAMINS W/ MINERALS TAB 1 TABLET: TAB at 07:57

## 2019-08-27 RX ADMIN — NALTREXONE HYDROCHLORIDE 50 MG: 50 TABLET, FILM COATED ORAL at 07:57

## 2019-08-27 RX ADMIN — GABAPENTIN 300 MG: 300 CAPSULE ORAL at 07:57

## 2019-08-27 ASSESSMENT — MIFFLIN-ST. JEOR: SCORE: 2037.89

## 2019-08-27 ASSESSMENT — ACTIVITIES OF DAILY LIVING (ADL)
ORAL_HYGIENE: INDEPENDENT
DRESS: INDEPENDENT
HYGIENE/GROOMING: INDEPENDENT

## 2019-08-27 NOTE — PROGRESS NOTES
[facility] inquired about the patient's disposition and requested a copy of his discharge paperwork.  informed Vikram that the patient will be discharging to an [facility] facility today. Vikram provided her fax number for the patient's discharge paperwork to be faxed to her.

## 2019-08-27 NOTE — PLAN OF CARE
Discharge :  denies suicidal thoughts, thoughts of self harm and hallucinations.  Has full range affect. Pleasant and cooperative, reports feeling excited [facility].  Received written discharge instructions with verbal explanation.  All questions answered, verbalizes understanding.  Received filled prescriptions and all belongings returned to patient.  [facility] to [facility] at 0[facility] via taxi cab.

## 2019-08-27 NOTE — DISCHARGE INSTRUCTIONS
Behavioral Discharge Planning and Instructions  Summary:  You were admitted on 8/5/2019  due to suicidal ideation in the context of alcohol use. You were treated by Dr. Jeremy Marshall MD and discharged on 8/19/19  [facility] at [address] [place] [address].      Principal Diagnosis:   Schizoaffective Disorder    Health Care Follow-up Appointments:   Primary Care   Wednesday, September 4th at 1:00pm with [facility] [address] [place] [address]  Phone: [phone]  The agency asks that you arrive at least 15 minutes prior to the start of your appointment to complete new patient paperwork. Please be sure to bring your ID and insurance card to present to reception staff. If you need to reschedule this appointment, please do so by calling the number listed above.     Outpatient Therapy   Monday, October 7th at 2:30pm with Gene Mccracken  [facility]   [address] [place] [address]  Phone: [phone]  The agency asks that you arrive at least 15 minutes prior to the start of your appointment to complete new patient paperwork. Please be sure to bring your ID and insurance card to present to reception staff. If you need to reschedule this appointment, please do so by calling the number listed above.     Outpatient Psychiatry   Saturday, November 9th at 1:15pm with Sukhdev Lima MD  [facility]   [address] [place] [address]  Phone: [phone]  The agency asks that you arrive at least 15 minutes prior to the start of your appointment to complete new patient paperwork. Please be sure to bring your ID and insurance card to present to reception staff. If you need to reschedule this appointment, please do so by calling the number listed above.     Attend all scheduled appointments with your outpatient providers. Call at least 24 hours in advance  "if you need to reschedule an appointment to ensure continued access to your outpatient providers.   Major Treatments, Procedures and Findings:  You were provided with: a psychiatric assessment, medication evaluation and/or management and CD evaluation/assessment    Symptoms to Report: feeling more aggressive, increased confusion, losing more sleep, mood getting worse or thoughts of suicide    Early warning signs can include: increased depression or anxiety sleep disturbances increased thoughts or behaviors of suicide or self-harm  increased unusual thinking, such as paranoia or hearing voices    Safety and Wellness:  Take all medicines as directed.  Make no changes unless your doctor suggests them. Follow treatment recommendations.  Refrain from alcohol and non-prescribed drugs.  If there is a concern for safety, call 911.  - Firearms  - Medicines (both prescribed and over-the-counter)  - Knives and other sharp objects  - Ropes and like materials  - Car keys  If there is a concern for safety, call 911. If there is a concern for safety, call 911.    Resources:   [facility] (COPE) Response - Adult 309-963-8102  Crisis Intervention: 284.808.8546 or 297-393-4079 (TTY: 723.176.6286).  Call anytime for help.  National [place] on Mental Illness (www.[place].anika.org): 220.722.4562 or 170-311-5077.  Suicide Awareness Voices of Education (SAVE) (www.save.org): 730-702-OSQT (6583)  National Suicide Prevention Line (www.mentalhealthmn.org): 547-126-QKWU (4703)  Mental Health Consumer/Survivor Network of [place] (www.mhcsn.net): 270.305.1015 or 644-498-0089  Mental Health Association of [place] (www.mentalhealth.org): 152.932.9459 or 142-796-6963  Self- Management and Recovery Training., SMART-- Toll free: 488.582.8476  www.Spartz.SDNsquare  Text 4 Life: txt \"LIFE\" to 18660 for immediate support and crisis intervention  Crisis text line: Text \"[place]\" to 341329. Free, confidential, 24/7.  Crisis Intervention: 817.196.3035 or " "718.932.2164. Call anytime for help.     The treatment team has appreciated the opportunity to work with you.     If you have any questions or concerns our unit number is [phone].  You may be receiving a follow-up phone call within the next three days from a representative from behavioral health.        Resources:   Crisis Intervention: 674.683.3797 or 097-408-8654 (TTY: 868.599.4457).  Call anytime for help.  National [place] on Mental Illness (www.[place].anika.org): 916.453.7606 or 398-774-9686.  Alcoholics Anonymous (www.alcoholics-anonymous.org): Check your phone book for your local chapter.  Suicide Awareness Voices of Education (SAVE) (www.save.org): 329-319-SAVE (7283)  [facility] Crisis (COPE) Response - Adult 685 617-8479  Text 4 Life: txt \"LIFE\" to 76138 for immediate support and crisis intervention    The treatment team has appreciated the opportunity to work with you.     If you have any questions or concerns our unit number is [phone].      "

## 2019-08-27 NOTE — DISCHARGE SUMMARY
Admit Date:     08/05/2019   Discharge Date:     08/27/2019      The patient was hospitalized between 08/05 and 08/27/2019.  Greater than 50% of 40 minutes was spent with him.  It was spent on counseling and coordinating care, clarifying diagnostic and prognostic issues and discharge plan.      CHIEF COMPLAINT AND REASON FOR ADMISSION:  The patient is a 48-year-old  male who was admitted because of heavy use of alcohol and suicidal thoughts, reported noncompliance with psychotropic medication for about 1 year, depressed mood, suicidal ideation.  He reported near suicide attempt prior to his presentation which involved gaining consciousness after a blackout to find a noose wrapped around his neck.  For more details about the patient's prior psychiatric history and his recent symptoms, please refer to Dr. David Collins's note from 08/06/2019.      DISCHARGE DIAGNOSES:  Major depressive disorder, recurrent, moderate to severe versus bipolar affective disorder and alcohol use disorder, severe.  Please note that the patient's diagnosis of schizoaffective disorder was removed during this hospitalization.  Patient did not seem to meet criteria for this diagnosis.      CONSULTS:  He was seen by chemical dependency counselor for CD evaluation.  He met with Internal Medicine for management of hypertension, was started on amlodipine, started on clobetasol cream twice a day for chronic psoriasis, 4 chronic lung nodules, history of bladder cancer, chronic hepatitis and was referred to primary care provider.  For chronic hepatitis C, he was recommended to go to [facility] at discharge.  I appreciate Internal Medicine's help with this patient.      HOSPITAL COURSE: The patient was put on alcohol withdrawal protocol and he, in fact, had quite severe withdrawal, was shaking for 2-3 days and needed diazepam.  He was pretty irritable in the beginning of hospitalization, however, much less so  by the end of hospitalization.  He presented as pretty open in his desire to stop drinking and achieve sobriety and improve his depression symptoms.  He talked openly about how he has been isolated and  from his other family members.  He reported auditory hallucinations, people either talking negatively about himself or calling his name in the beginning of hospitalization; however, denied auditory hallucinations in the end.  Denied thoughts of suicide.  Our initial plan was to send him to chemical dependency treatment program; however, in discussion with the patient, it appeared that his mental health also presented significant problem and he agreed to go to [facility].  A number of medication changes were made during this hospitalization.  The patient was started on fluoxetine and amlodipine, followed by Internal Medicine.  He was started on naltrexone for craving.  I interviewed him on the day of discharge.  The patient was in a good mood.  He knew that he was going to [facility], felt it was a good place for him.  He denied suicidal or homicidal thoughts and was hopeful for the future.      DISCHARGE MEDICATIONS:   1.  Amlodipine 7.5 mg daily.   2.  Clobetasol 0.05% external cream 2 times a day.   3.  Clonidine 0.1 mg by mouth every 4 hours as needed for systolic blood pressure higher than 160.   4.  Fluoxetine 40 mg daily.   5.  Gabapentin 300 mg 3 times a day.   6.  Hydroxyzine 50 mg every 4 hours as needed for anxiety.   7.  Multivitamins with minerals 1 tablet daily.   8.  Naltrexone 50 mg daily.   9.  Nicotine 2 mg lozenges inside cheek every hour as needed for smoking cessation.   10.  Trazodone 50 mg nightly as needed for sleep.  Primary care followup appointment on 09/04 at 1:00 p.m. with Merrill Leggett at [facility], for outpatient therapy on 10/07 at 2:30 with John Mccracken at [facility], for outpatient psychiatrist on 11/09 at 1:15 with Lulú Lima  at [facility] in [place].         YASIR WAYNE MD             D: 2019   T: 2019   MT: ALINA      Name:     MERRILL HALL   MRN:      [id number]        Account:        [id number]   :      1970           Admit Date:     2019                                  Discharge Date: 2019      Document: [id number]       cc: Merrill Leggett MD

## 2019-08-27 NOTE — PROGRESS NOTES
Patient visible/present in the milieu for most of the evening.  Overall, presented as bright, pleasant, relaxed, alert, and focused. Mood appeared calm and stable.  Mood full-range, almost euthymic. Verbalized thought processes were generally linear, organized, and devoid of delusional references and/or overt psychotic distortions.   Peer interactions were spontaneous, congenial, positive, and supportive. His interactions with staff were open and cooperative. Patient essentially denied/minimized all significant MH issues, concerns, and acuities at this time, including depression, anxiety, SI/SIB, AH/VH's, etc.  He is excited about his discharge tomorrow morning and impressed writer with having a good degree of hopefulness about his future now.  Abdon Barrios   08/26/2019

## 2019-09-04 ENCOUNTER — OFFICE VISIT - HEALTHEAST (OUTPATIENT)
Dept: FAMILY MEDICINE | Facility: CLINIC | Age: 49
End: 2019-09-04

## 2019-09-04 ENCOUNTER — COMMUNICATION - HEALTHEAST (OUTPATIENT)
Dept: TELEHEALTH | Facility: CLINIC | Age: 49
End: 2019-09-04

## 2019-09-04 DIAGNOSIS — I73.9 CLAUDICATION (H): ICD-10-CM

## 2019-09-04 DIAGNOSIS — F10.20 ALCOHOLISM (H): ICD-10-CM

## 2019-09-04 DIAGNOSIS — B19.20 HEPATITIS C VIRUS INFECTION WITHOUT HEPATIC COMA, UNSPECIFIED CHRONICITY: ICD-10-CM

## 2019-09-04 DIAGNOSIS — K22.4 ESOPHAGEAL SPASM: ICD-10-CM

## 2019-09-04 DIAGNOSIS — L40.9 PSORIASIS: ICD-10-CM

## 2019-09-04 DIAGNOSIS — F32.3 MAJOR DEPRESSION WITH PSYCHOTIC FEATURES (H): ICD-10-CM

## 2019-09-04 DIAGNOSIS — F17.210 CIGARETTE NICOTINE DEPENDENCE WITHOUT COMPLICATION: ICD-10-CM

## 2019-09-04 DIAGNOSIS — Z00.00 HEALTHCARE MAINTENANCE: ICD-10-CM

## 2019-09-04 DIAGNOSIS — I73.9 PAD (PERIPHERAL ARTERY DISEASE) (H): ICD-10-CM

## 2019-09-04 LAB — ALT SERPL W P-5'-P-CCNC: 32 U/L (ref 0–45)

## 2019-09-04 ASSESSMENT — MIFFLIN-ST. JEOR: SCORE: 1976.28

## 2019-09-06 ENCOUNTER — COMMUNICATION - HEALTHEAST (OUTPATIENT)
Dept: FAMILY MEDICINE | Facility: CLINIC | Age: 49
End: 2019-09-06

## 2019-10-09 ENCOUNTER — RECORDS - HEALTHEAST (OUTPATIENT)
Dept: ADMINISTRATIVE | Facility: OTHER | Age: 49
End: 2019-10-09

## 2021-05-27 ENCOUNTER — RECORDS - HEALTHEAST (OUTPATIENT)
Dept: ADMINISTRATIVE | Facility: CLINIC | Age: 51
End: 2021-05-27

## 2021-06-01 NOTE — PROGRESS NOTES
Assessment/ Plan    Problem List Items Addressed This Visit        Unprioritized    Alcoholism (H) - Primary     Inpatient residential treatment currently  On naltrexone  No alcohol since discharge  Reports doing fairly well.  Will check ALT         Major depression with psychotic features (H)    Cigarette nicotine dependence without complication     Pre-contemplative about quitting, has tried a couple of times in the past  Discussed option of non-smoking cigarettes particularly in the possibility of peripheral vascular disease.         Psoriasis    Relevant Orders    Ambulatory referral to Dermatology    Esophageal spasm     My suspicion as to diagnosis based on history alone  Sudden onset squeezing epigastric pressure that makes him feel he like he wants to drink and burp  Last for 1530 minutes then disappears often comes in couplets, only about once or twice a year  No known GERD    Advised to speak to GI about this with his upcoming appointment for hepatitis C  Discussed option of empiric omeprazole  Already on amlodipine         Hepatitis C virus infection without hepatic coma, unspecified chronicity     Patient indicates that he has a history of this that is quiesced sent/dormant.  Told this at his recent hospitalization.  I looked extensively for these lab results and was unable to find them.  He indicates that he has the follow-up appointment with gastroenterology that was mentioned in the discharge summary.  Will count on them to find lab tests or order new ones.         Relevant Orders    ALT (SGPT)    Claudication (H)    Relevant Medications    atorvastatin (LIPITOR) 40 MG tablet    aspirin 81 MG EC tablet    Other Relevant Orders    ALT (SGPT)    PAD (peripheral artery disease) (H)     Discussed exercise treatment-recommended 20 minutes.  Studies done on 30 minutes 3 days/week  Wrongly recommend smoking cessation.  Patient working hard on this.  Declines Chantix and bupropion since he is used them in the  past and have not been effective/had side effects  Start aspirin, start atorvastatin-discussed secondary prevention/risk of vascular disease elsewhere.         Relevant Medications    atorvastatin (LIPITOR) 40 MG tablet    aspirin 81 MG EC tablet    Other Relevant Orders    ALT (SGPT)      Other Visit Diagnoses     Healthcare maintenance        Relevant Orders    Td, Preservative Free (green label) (Completed)          Subjective  CC:  Chief Complaint   Patient presents with     Hospital Visit Follow Up     HPI:  48-year-old with history of schizoaffective disorder, chemical dependency/alcohol, carrier of hepatitis C, tobacco abuse here for follow-up for inpatient stay at [facility] between 8/5 and 8/27/2019.  Patient admitted after suicide attempt/near suicide when he attempted to hang himself with a lawnmower gas hose.    Discharge diagnosis included major depressive disorder versus bipolar affective disorder plus alcohol use disorder.  Schizoaffective was removed.    Seen by chemical dependency.  Met with internal medicine for management of hypertension was started on amlodipine.  Started on clobetasol for chronic psoriasis  Noted to have for chronic lung nodules  Has history of bladder cancer  History of chronic hepatitis.  -History of hepatitis C, was recommended he go to the [facility].    Put on alcohol withdrawal protocol and had severe withdrawal, 2 to 3 days in duration, needing diazepam.  He reported auditory hallucinations that resolved by the end of the hospitalization.  Denied suicidal thoughts at the end of the hospitalization.  Plan was to go through chemical dependency treatment, however it was treatment team's opinion in the end that mental health was a significant problem so he agreed to go to [facility] naltrexone was started.  Review of patient's labs.  8/7/2019, cholesterol 231, triglycerides 153 HDL 68,  BMP same date normal except borderline elevated glucose 101, CBC  normal except , MCH 33.6 lites were normal ALT 47, AST 41 urine drug screen positive for benzodiazepines otherwise negative except for ethanol breath test 0.218    In fairly extensive review of care everywhere records, I am unable to find documentation of hepatitis C antibody status or quantitative hepatitis C RNA by PCR/genotype    Discharge medications included amlodipine 7.5, clobetasol 2 times a day, clonidine as needed, fluoxetine 40 mg daily, gabapentin 300 3 times daily, hydroxyzine 50 every 4 hours as needed, multivitamin, naltrexone 50 mg a day, nicotine lozenges 2 mg as needed, trazodone 50 mg nightly.    Follow-up plan was as follows:  Follow-up here at [facility].  For outpatient therapy to 30 10/7 at [facility] with John Mccracken.  For outpatient psychiatry, 11/9 at [facility] with Lulú Lima  -------------------------------  In discussion with patient, he feels that things are going reasonably well.  In our EMS, inpatient residential treatment for mental health and chemical dependency.  He has been through treatment for chemical dependency and a couple of times in the past.    Complains of leg pain, been going on for 3 years or so that comes on when he walks a certain distance, may be 2 or 3 blocks, then he needs to stop and rest for 1 minute or so, then he can keep going.  Usually when he keeps going he is able to walk further than before.  Feels like his legs goes stiff and ache, both about the same time/amount.  No significant complaint of low back pain though he does have some upper leg pain as well.    Also complains about new lesions on his forearms.  Seems like he gets a little bump and then a scab then a new lesion that bleeds easily.  He has a history of psoriasis.  In the hospital they prescribed him clobetasol which he thinks has not been helping a great deal.    Finally, complains of intermittent epigastric pain that he gets once or twice a year.  Sudden onset of severe pain, feels like a  "tightening or squeezing, comes out of the blue, lasts for about 15 minutes, then is relieved often by drinking water.  He grunts and makes noise during these times.  Then, usually, he will get another episode shortly after-they come in pairs.  He had one a few days ago, has not been drinking alcohol since discharge.  Does not seem to be related to food.  History of cholecystectomy in the past.    Patient smokes cigarettes.  He is tried to stop on a number of occasions, maximal time quit has been about 2 or 3 weeks.  He is miserable with cravings when he quits.        PFSH:  Patient Active Problem List   Diagnosis     Alcoholism (H)     Major depression with psychotic features (H)     Cigarette nicotine dependence without complication     Psoriasis     Esophageal spasm     Hepatitis C virus infection without hepatic coma, unspecified chronicity     Claudication (H)     PAD (peripheral artery disease) (H)     Current medications reviewed as follows:  No current outpatient medications on file prior to visit.     No current facility-administered medications on file prior to visit.      Social History     Tobacco Use   Smoking Status Current Every Day Smoker   Smokeless Tobacco Never Used   Tobacco Comment    half a pack/day     Social History     Social History Narrative     Not on file     Patient Care Team:  Mrerill Leggett MD as PCP - General (Family Medicine)  ROS  Full 10 system review including constitutional, respiratory, cardiac, gi, urinary, rheumatologic, neurologic, reproductive, dermatologic psychiatric is  performed  and is otherwise negative         Objective  Physical Exam  Vitals:    09/04/19 1252   BP: 128/78   Patient Site: Right Arm   Patient Position: Sitting   Cuff Size: Adult Regular   Pulse: 84   Resp: 12   Temp: 98.6  F (37  C)   TempSrc: Oral   Weight: (!) 237 lb (107.5 kg)   Height: 6' 0.84\" (1.85 m)     Body mass index is 31.41 kg/m .  Gen- alert, oriented/ appropriately " responsive  HEENT- normal cephalic, atraumatic.   Chest- Normal inspiration and expiration.    Clear to ascultation.    No chest wall deformity or scar.  CV- Heart regular rate and rhythm  normal tones, no murmurs   No gallops or rubs.  Ext- appear well perfused, no edema  Skin- warm and dry,   no visualized rash  Normal-appearing legs, no significant edema  Diagnostics:   Pending    JAVID results listed above  Please note: Voice recognition software was used in this dictation.  It may therefore contain typographical errors.

## 2021-06-03 VITALS
TEMPERATURE: 98.6 F | RESPIRATION RATE: 12 BRPM | HEIGHT: 73 IN | WEIGHT: 237 LBS | DIASTOLIC BLOOD PRESSURE: 78 MMHG | BODY MASS INDEX: 31.41 KG/M2 | SYSTOLIC BLOOD PRESSURE: 128 MMHG | HEART RATE: 84 BPM

## 2021-06-19 NOTE — LETTER
Letter by Merrill Leggett MD at      Author: Merrill Leggett MD Service: -- Author Type: --    Filed:  Encounter Date: 9/6/2019 Status: (Other)         Merrill Corcoran  [address]  [place] [address]             September 6, 2019         Dear Mr. Corcoran,    Below are the results from your recent visit:    Resulted Orders   ALT (SGPT)   Result Value Ref Range    ALT 32 0 - 45 U/L       Merrill, I did this test of your liver partly because you are on naltrexone and partly because I was starting a cholesterol-lowering medicine.  It looks good.  We will recheck it in 1 month when you come back.      Please call with questions or contact us using [employer].    Sincerely,        Electronically signed by Merrill Leggett MD

## 2022-01-24 NOTE — PROGRESS NOTES
08/13/19 1500   Occupational Therapy   Type of Intervention structured groups   Response Initiates, socially acceptable   Hours 2     Attended 2/2 occupational therapy groups offered this date. Overall congruent affect and full engagement.      Occupational therapy clinic. Pt Response: Demonstrated consistent performance skills as observed on previous dates.     Coping skill exploration group through discussion on mindfulness and guided meditation for decreased anxiety and stress management. Education was provided on the benefit of mindfulness and use of meditation within daily/weekly routine. Pt Response: Insightful during discussion; expressed difficulty with being nonjudgmental toward self, however determined to remain optimistic and practice self-love.      Patient education provided.

## 2023-01-17 NOTE — PLAN OF CARE
"  Problem: OT General Care Plan  Goal: OT Frequency  Description  Pt will practice using >2 coping strategies to manage stress and reduce symptoms to demonstrate increased readiness for discharge.     Attended 2/2 occupational therapy groups offered this date. Overall congruent affect and full engagement.    Mental health management group focused on self-care planning. Education was provided on various ways to take care of one's emotional, physical, social, mental, and occupational self. Pt Response: Insightful; identified >2 self-care strategies/domain of 'My Self Care Plan\" worksheet, specifically shower and shaving, stopping when feeling overwhelmed or \"manic-y\", and making better food choices.   Occupational therapy clinic to explore a new craft. Pt Response: Appeared to enjoy himself throughout clinic. Demonstrated consistent performance skills as observed on previous dates.     Outcome: Improving     " Doxycycline Pregnancy And Lactation Text: This medication is Pregnancy Category D and not consider safe during pregnancy. It is also excreted in breast milk but is considered safe for shorter treatment courses. Sarecycline Counseling: Patient advised regarding possible photosensitivity and discoloration of the teeth, skin, lips, tongue and gums.  Patient instructed to avoid sunlight, if possible.  When exposed to sunlight, patients should wear protective clothing, sunglasses, and sunscreen.  The patient was instructed to call the office immediately if the following severe adverse effects occur:  hearing changes, easy bruising/bleeding, severe headache, or vision changes.  The patient verbalized understanding of the proper use and possible adverse effects of sarecycline.  All of the patient's questions and concerns were addressed. Detail Level: Simple Azelaic Acid Pregnancy And Lactation Text: This medication is considered safe during pregnancy and breast feeding. Topical Sulfur Applications Counseling: Topical Sulfur Counseling: Patient counseled that this medication may cause skin irritation or allergic reactions.  In the event of skin irritation, the patient was advised to reduce the amount of the drug applied or use it less frequently.   The patient verbalized understanding of the proper use and possible adverse effects of topical sulfur application.  All of the patient's questions and concerns were addressed. Spironolactone Counseling: Patient advised regarding risks of diarrhea, abdominal pain, hyperkalemia, birth defects (for female patients), liver toxicity and renal toxicity. The patient may need blood work to monitor liver and kidney function and potassium levels while on therapy. The patient verbalized understanding of the proper use and possible adverse effects of spironolactone.  All of the patient's questions and concerns were addressed. Bactrim Counseling:  I discussed with the patient the risks of sulfa antibiotics including but not limited to GI upset, allergic reaction, drug rash, diarrhea, dizziness, photosensitivity, and yeast infections.  Rarely, more serious reactions can occur including but not limited to aplastic anemia, agranulocytosis, methemoglobinemia, blood dyscrasias, liver or kidney failure, lung infiltrates or desquamative/blistering drug rashes. Winlevi Counseling:  I discussed with the patient the risks of topical clascoterone including but not limited to erythema, scaling, itching, and stinging. Patient voiced their understanding. Erythromycin Pregnancy And Lactation Text: This medication is Pregnancy Category B and is considered safe during pregnancy. It is also excreted in breast milk. Birth Control Pills Counseling: Birth Control Pill Counseling: I discussed with the patient the potential side effects of OCPs including but not limited to increased risk of stroke, heart attack, thrombophlebitis, deep venous thrombosis, hepatic adenomas, breast changes, GI upset, headaches, and depression.  The patient verbalized understanding of the proper use and possible adverse effects of OCPs. All of the patient's questions and concerns were addressed. Birth Control Pills Pregnancy And Lactation Text: This medication should be avoided if pregnant and for the first 30 days post-partum. Topical Sulfur Applications Pregnancy And Lactation Text: This medication is Pregnancy Category C and has an unknown safety profile during pregnancy. It is unknown if this topical medication is excreted in breast milk. Topical Retinoid counseling:  Patient advised to apply a pea-sized amount only at bedtime and wait 30 minutes after washing their face before applying.  If too drying, patient may add a non-comedogenic moisturizer. The patient verbalized understanding of the proper use and possible adverse effects of retinoids.  All of the patient's questions and concerns were addressed. Spironolactone Pregnancy And Lactation Text: This medication can cause feminization of the male fetus and should be avoided during pregnancy. The active metabolite is also found in breast milk. Tetracycline Pregnancy And Lactation Text: This medication is Pregnancy Category D and not consider safe during pregnancy. It is also excreted in breast milk. High Dose Vitamin A Counseling: Side effects reviewed, pt to contact office should one occur. Winlevi Pregnancy And Lactation Text: This medication is considered safe during pregnancy and breastfeeding. Aklief Pregnancy And Lactation Text: It is unknown if this medication is safe to use during pregnancy.  It is unknown if this medication is excreted in breast milk.  Breastfeeding women should use the topical cream on the smallest area of the skin for the shortest time needed while breastfeeding.  Do not apply to nipple and areola. Tazorac Counseling:  Patient advised that medication is irritating and drying.  Patient may need to apply sparingly and wash off after an hour before eventually leaving it on overnight.  The patient verbalized understanding of the proper use and possible adverse effects of tazorac.  All of the patient's questions and concerns were addressed. Azithromycin Counseling:  I discussed with the patient the risks of azithromycin including but not limited to GI upset, allergic reaction, drug rash, diarrhea, and yeast infections. Use Enhanced Medication Counseling?: No Topical Clindamycin Counseling: Patient counseled that this medication may cause skin irritation or allergic reactions.  In the event of skin irritation, the patient was advised to reduce the amount of the drug applied or use it less frequently.   The patient verbalized understanding of the proper use and possible adverse effects of clindamycin.  All of the patient's questions and concerns were addressed. Dapsone Pregnancy And Lactation Text: This medication is Pregnancy Category C and is not considered safe during pregnancy or breast feeding. Minocycline Counseling: Patient advised regarding possible photosensitivity and discoloration of the teeth, skin, lips, tongue and gums.  Patient instructed to avoid sunlight, if possible.  When exposed to sunlight, patients should wear protective clothing, sunglasses, and sunscreen.  The patient was instructed to call the office immediately if the following severe adverse effects occur:  hearing changes, easy bruising/bleeding, severe headache, or vision changes.  The patient verbalized understanding of the proper use and possible adverse effects of minocycline.  All of the patient's questions and concerns were addressed. Azelaic Acid Counseling: Patient counseled that medicine may cause skin irritation and to avoid applying near the eyes.  In the event of skin irritation, the patient was advised to reduce the amount of the drug applied or use it less frequently.   The patient verbalized understanding of the proper use and possible adverse effects of azelaic acid.  All of the patient's questions and concerns were addressed. Bactrim Pregnancy And Lactation Text: This medication is Pregnancy Category D and is known to cause fetal risk.  It is also excreted in breast milk. Erythromycin Counseling:  I discussed with the patient the risks of erythromycin including but not limited to GI upset, allergic reaction, drug rash, diarrhea, increase in liver enzymes, and yeast infections. Topical Clindamycin Pregnancy And Lactation Text: This medication is Pregnancy Category B and is considered safe during pregnancy. It is unknown if it is excreted in breast milk. Azithromycin Pregnancy And Lactation Text: This medication is considered safe during pregnancy and is also secreted in breast milk. Benzoyl Peroxide Counseling: Patient counseled that medicine may cause skin irritation and bleach clothing.  In the event of skin irritation, the patient was advised to reduce the amount of the drug applied or use it less frequently.   The patient verbalized understanding of the proper use and possible adverse effects of benzoyl peroxide.  All of the patient's questions and concerns were addressed. Isotretinoin Counseling: Patient should get monthly blood tests, not donate blood, not drive at night if vision affected, not share medication, and not undergo elective surgery for 6 months after tx completed. Side effects reviewed, pt to contact office should one occur. Tetracycline Counseling: Patient counseled regarding possible photosensitivity and increased risk for sunburn.  Patient instructed to avoid sunlight, if possible.  When exposed to sunlight, patients should wear protective clothing, sunglasses, and sunscreen.  The patient was instructed to call the office immediately if the following severe adverse effects occur:  hearing changes, easy bruising/bleeding, severe headache, or vision changes.  The patient verbalized understanding of the proper use and possible adverse effects of tetracycline.  All of the patient's questions and concerns were addressed. Patient understands to avoid pregnancy while on therapy due to potential birth defects. Benzoyl Peroxide Pregnancy And Lactation Text: This medication is Pregnancy Category C. It is unknown if benzoyl peroxide is excreted in breast milk. Isotretinoin Pregnancy And Lactation Text: This medication is Pregnancy Category X and is considered extremely dangerous during pregnancy. It is unknown if it is excreted in breast milk. High Dose Vitamin A Pregnancy And Lactation Text: High dose vitamin A therapy is contraindicated during pregnancy and breast feeding. Dapsone Counseling: I discussed with the patient the risks of dapsone including but not limited to hemolytic anemia, agranulocytosis, rashes, methemoglobinemia, kidney failure, peripheral neuropathy, headaches, GI upset, and liver toxicity.  Patients who start dapsone require monitoring including baseline LFTs and weekly CBCs for the first month, then every month thereafter.  The patient verbalized understanding of the proper use and possible adverse effects of dapsone.  All of the patient's questions and concerns were addressed. Topical Retinoid Pregnancy And Lactation Text: This medication is Pregnancy Category C. It is unknown if this medication is excreted in breast milk. Tazorac Pregnancy And Lactation Text: This medication is not safe during pregnancy. It is unknown if this medication is excreted in breast milk. Aklief counseling:  Patient advised to apply a pea-sized amount only at bedtime and wait 30 minutes after washing their face before applying.  If too drying, patient may add a non-comedogenic moisturizer.  The most commonly reported side effects including irritation, redness, scaling, dryness, stinging, burning, itching, and increased risk of sunburn.  The patient verbalized understanding of the proper use and possible adverse effects of retinoids.  All of the patient's questions and concerns were addressed. Doxycycline Counseling:  Patient counseled regarding possible photosensitivity and increased risk for sunburn.  Patient instructed to avoid sunlight, if possible.  When exposed to sunlight, patients should wear protective clothing, sunglasses, and sunscreen.  The patient was instructed to call the office immediately if the following severe adverse effects occur:  hearing changes, easy bruising/bleeding, severe headache, or vision changes.  The patient verbalized understanding of the proper use and possible adverse effects of doxycycline.  All of the patient's questions and concerns were addressed.
